# Patient Record
Sex: FEMALE | Race: WHITE | NOT HISPANIC OR LATINO | Employment: OTHER | URBAN - METROPOLITAN AREA
[De-identification: names, ages, dates, MRNs, and addresses within clinical notes are randomized per-mention and may not be internally consistent; named-entity substitution may affect disease eponyms.]

---

## 2017-01-17 ENCOUNTER — OFFICE VISIT (OUTPATIENT)
Dept: DERMATOLOGY | Facility: CLINIC | Age: 82
End: 2017-01-17
Payer: MEDICARE

## 2017-01-17 VITALS — WEIGHT: 121 LBS | BODY MASS INDEX: 27.13 KG/M2

## 2017-01-17 DIAGNOSIS — I87.2 VENOUS STASIS DERMATITIS OF BOTH LOWER EXTREMITIES: ICD-10-CM

## 2017-01-17 PROCEDURE — 99999 PR PBB SHADOW E&M-EST. PATIENT-LVL II: CPT | Mod: PBBFAC,,, | Performed by: DERMATOLOGY

## 2017-01-17 PROCEDURE — 1157F ADVNC CARE PLAN IN RCRD: CPT | Mod: S$GLB,,, | Performed by: DERMATOLOGY

## 2017-01-17 PROCEDURE — 1160F RVW MEDS BY RX/DR IN RCRD: CPT | Mod: S$GLB,,, | Performed by: DERMATOLOGY

## 2017-01-17 PROCEDURE — 1159F MED LIST DOCD IN RCRD: CPT | Mod: S$GLB,,, | Performed by: DERMATOLOGY

## 2017-01-17 PROCEDURE — 99213 OFFICE O/P EST LOW 20 MIN: CPT | Mod: S$GLB,,, | Performed by: DERMATOLOGY

## 2017-01-17 RX ORDER — BETAMETHASONE DIPROPIONATE 0.5 MG/G
CREAM TOPICAL 2 TIMES DAILY
Qty: 90 G | Refills: 3 | Status: SHIPPED | OUTPATIENT
Start: 2017-01-17 | End: 2017-01-27

## 2017-01-17 RX ORDER — NEBIVOLOL HYDROCHLORIDE 10 MG/1
TABLET ORAL
COMMUNITY
Start: 2017-01-11 | End: 2018-07-13 | Stop reason: ALTCHOICE

## 2017-01-17 RX ORDER — IRBESARTAN 75 MG/1
75 TABLET ORAL DAILY
Refills: 2 | COMMUNITY
Start: 2016-12-16 | End: 2017-07-25 | Stop reason: ALTCHOICE

## 2017-01-17 RX ORDER — CLOTRIMAZOLE AND BETAMETHASONE DIPROPIONATE 10; .64 MG/G; MG/G
CREAM TOPICAL
Refills: 2 | COMMUNITY
Start: 2016-12-23 | End: 2018-10-20

## 2017-01-17 RX ORDER — MOMETASONE FUROATE 220 UG/1
INHALANT RESPIRATORY (INHALATION)
Refills: 4 | COMMUNITY
Start: 2016-12-23 | End: 2017-03-14

## 2017-01-17 RX ORDER — AMLODIPINE BESYLATE 5 MG/1
5 TABLET ORAL NIGHTLY
Refills: 6 | COMMUNITY
Start: 2017-01-10 | End: 2018-07-13

## 2017-01-17 RX ORDER — DOXYCYCLINE 100 MG/1
CAPSULE ORAL
Refills: 0 | COMMUNITY
Start: 2016-11-28 | End: 2018-07-13

## 2017-01-17 RX ORDER — CLONIDINE HYDROCHLORIDE 0.1 MG/1
TABLET ORAL
Refills: 0 | COMMUNITY
Start: 2017-01-09 | End: 2017-03-14

## 2017-01-17 NOTE — PROGRESS NOTES
Subjective:       Patient ID:  Toya Majano is a 83 y.o. female who presents for   Chief Complaint   Patient presents with    Rash     HPI Comments: See previous note, had pyoderma of right leg and was treated for it now improved still with stasis dermatitis both lower legs.    Rash         Review of Systems   Constitutional: Negative for fever.   Skin: Positive for itching and rash.   Hematologic/Lymphatic: Does not bruise/bleed easily.        Objective:    Physical Exam   Skin:   Areas Examined (abnormalities noted in diagram):   Head / Face Inspection Performed  Neck Inspection Performed  RUE Inspected  LUE Inspection Performed  RLE Inspected  LLE Inspection Performed              Diagram Legend      Erythematous eczematous patches      Assessment / Plan:        Venous stasis dermatitis of both lower extremities  -     betamethasone dipropionate (DIPROLENE) 0.05 % cream; Apply topically 2 (two) times daily.  Dispense: 90 g; Refill: 3  Leg elevation  Support socks  hibiclens weekly in shower             Return in about 4 weeks (around 2/14/2017).

## 2017-01-17 NOTE — MR AVS SNAPSHOT
[facility]   [facility]  [place] [address]  Phone: [phone]  Fax: [phone]                  Toya Majano   2017 10:40 AM   Office Visit    Description:  Female : 3/27/1933   Provider:  Talia Mcwilliams MD   Department:  [facility]           Reason for Visit     Rash           Diagnoses this Visit        Comments    Venous stasis dermatitis of both lower extremities                To Do List           Goals (5 Years of Data)     None      Follow-Up and Disposition     Return in about 4 weeks (around 2017).       These Medications        Disp Refills Start End    betamethasone dipropionate (DIPROLENE) 0.05 % cream 90 g 3 2017    Apply topically 2 (two) times daily. - Topical (Top)    Pharmacy: [facility] #[id number] - [place] - [address]  #: [phone]         [facility] On Call     [facility] On Call Nurse Care Line -  Assistance  Registered nurses in the [facility] On Call Center provide clinical advisement, health education, appointment booking, and other advisory services.  Call for this free service at [phone].             Medications           Message regarding Medications     Verify the changes and/or additions to your medication regime listed below are the same as discussed with your clinician today.  If any of these changes or additions are incorrect, please notify your healthcare provider.             Verify that the below list of medications is an accurate representation of the medications you are currently taking.  If none reported, the list may be blank. If incorrect, please contact your healthcare provider. Carry this list with you in case of emergency.           Current Medications     amlodipine (NORVASC) 5 MG tablet Take 5 mg by mouth every evening.    ASMANEX TWISTHALER 220 mcg (30 doses) inhaler INHALE 2 PUFFS INTO LUNGS EVERY EVENING    ASMANEX TWISTHALER 220 mcg (60 doses) AePB      bisoprolol (ZEBETA) 5 MG tablet     BYSTOLIC 10 mg Tab     chlorthalidone (HYGROTEN) 25 MG Tab     ciprofloxacin HCl (CIPRO) 500 MG tablet     clobetasol (TEMOVATE) 0.05 % cream     cloNIDine (CATAPRES) 0.1 MG tablet TAKE 1 TABLET BY MOUTH TWICE A DAY OR AS DIRECTED    clotrimazole-betamethasone 1-0.05% (LOTRISONE) cream APPLY TO RIGHT LEG 3 TIMES DAILY    desoximetasone 0.05 % Gel     ELIQUIS 2.5 mg Tab     famotidine (PEPCID) 20 MG tablet Take 20 mg by mouth after dinner.    fenofibrate micronized (LOFIBRA) 134 MG Cap     folic acid (FOLVITE) 1 MG tablet     furosemide (LASIX) 20 MG tablet     furosemide (LASIX) 40 MG tablet     irbesartan (AVAPRO) 300 MG tablet     irbesartan (AVAPRO) 75 MG tablet Take 75 mg by mouth once daily.    isosorbide mononitrate (IMDUR) 120 MG 24 hr tablet     methylPREDNISolone (MEDROL) 4 MG Tab     montelukast (SINGULAIR) 10 mg tablet     MULTAQ 400 mg Tab 2 (two) times daily.     NEXIUM 40 mg capsule     PROLIA 60 mg/mL Syrg 60 mg every 6 (six) months.     RANOLAZINE (RANEXA ORAL) Take by mouth.    spironolactone (ALDACTONE) 25 MG tablet     ZETIA 10 mg tablet     betamethasone dipropionate (DIPROLENE) 0.05 % cream Apply topically 2 (two) times daily.    doxycycline (VIBRAMYCIN) 100 MG Cap TAKE ONE CAPSULE BY MOUTH EVERY 12 HOURS FOR 10 DAYS    gabapentin (NEURONTIN) 300 MG capsule Take 1 capsule (300 mg total) by mouth 3 (three) times daily.    methylPREDNISolone (MEDROL DOSEPACK) 4 mg tablet Take 4 mg by mouth. use as directed    WELCHOL 625 mg tablet            Clinical Reference Information           Vital Signs - Last Recorded  Most recent update: 1/17/2017 10:46 AM by Essie Cole MA    Wt BMI             54.9 kg (121 lb) 27.13 kg/m2         Allergies as of 1/17/2017     Adhesive    Penicillins    Codeine    Contrast Media      Immunizations Administered on Date of Encounter - 1/17/2017     None

## 2017-02-15 ENCOUNTER — OFFICE VISIT (OUTPATIENT)
Dept: DERMATOLOGY | Facility: CLINIC | Age: 82
End: 2017-02-15
Payer: MEDICARE

## 2017-02-15 VITALS — WEIGHT: 121 LBS | BODY MASS INDEX: 27.13 KG/M2

## 2017-02-15 DIAGNOSIS — B08.1 BATEMAN'S DISEASE: ICD-10-CM

## 2017-02-15 DIAGNOSIS — I87.2 STASIS DERMATITIS OF BOTH LEGS: Primary | ICD-10-CM

## 2017-02-15 PROCEDURE — 99999 PR PBB SHADOW E&M-EST. PATIENT-LVL III: CPT | Mod: PBBFAC,,, | Performed by: DERMATOLOGY

## 2017-02-15 PROCEDURE — 99213 OFFICE O/P EST LOW 20 MIN: CPT | Mod: S$GLB,,, | Performed by: DERMATOLOGY

## 2017-02-15 PROCEDURE — 1159F MED LIST DOCD IN RCRD: CPT | Mod: S$GLB,,, | Performed by: DERMATOLOGY

## 2017-02-15 PROCEDURE — 1157F ADVNC CARE PLAN IN RCRD: CPT | Mod: S$GLB,,, | Performed by: DERMATOLOGY

## 2017-02-15 PROCEDURE — 1160F RVW MEDS BY RX/DR IN RCRD: CPT | Mod: S$GLB,,, | Performed by: DERMATOLOGY

## 2017-02-15 RX ORDER — HYDRALAZINE HYDROCHLORIDE 25 MG/1
25 TABLET, FILM COATED ORAL 2 TIMES DAILY
Refills: 3 | COMMUNITY
Start: 2017-02-10

## 2017-02-15 RX ORDER — DILTIAZEM HYDROCHLORIDE 120 MG/1
CAPSULE, COATED, EXTENDED RELEASE ORAL
COMMUNITY
Start: 2017-02-13 | End: 2018-07-13

## 2017-02-15 NOTE — MR AVS SNAPSHOT
[facility]   [facility]  [place] [address]  Phone: [phone]  Fax: [phone]                  Toya Majano   2/15/2017 10:50 AM   Office Visit    Description:  Female : 3/27/1933   Provider:  Talia Mcwilliams MD   Department:  [facility]           Reason for Visit     Follow-up           Diagnoses this Visit        Comments    Stasis dermatitis of both legs    -  Primary     Shine's disease                To Do List           Goals (5 Years of Data)     None      Follow-Up and Disposition     Return in about 6 months (around 8/15/2017).      [facility] On Call     [facility] On Call Nurse Care Line -  Assistance  Registered nurses in the [facility] On Call Center provide clinical advisement, health education, appointment booking, and other advisory services.  Call for this free service at [phone].             Medications           Message regarding Medications     Verify the changes and/or additions to your medication regime listed below are the same as discussed with your clinician today.  If any of these changes or additions are incorrect, please notify your healthcare provider.             Verify that the below list of medications is an accurate representation of the medications you are currently taking.  If none reported, the list may be blank. If incorrect, please contact your healthcare provider. Carry this list with you in case of emergency.           Current Medications     amlodipine (NORVASC) 5 MG tablet Take 5 mg by mouth every evening.    ASMANEX TWISTHALER 220 mcg (30 doses) inhaler INHALE 2 PUFFS INTO LUNGS EVERY EVENING    ASMANEX TWISTHALER 220 mcg (60 doses) AePB     bisoprolol (ZEBETA) 5 MG tablet     BYSTOLIC 10 mg Tab     chlorthalidone (HYGROTEN) 25 MG Tab     ciprofloxacin HCl (CIPRO) 500 MG tablet     clobetasol (TEMOVATE) 0.05 % cream     cloNIDine (CATAPRES) 0.1 MG tablet TAKE 1 TABLET BY MOUTH TWICE A DAY OR AS DIRECTED     clotrimazole-betamethasone 1-0.05% (LOTRISONE) cream APPLY TO RIGHT LEG 3 TIMES DAILY    desoximetasone 0.05 % Gel     diltiaZEM (CARDIZEM CD) 120 MG Cp24     doxycycline (VIBRAMYCIN) 100 MG Cap TAKE ONE CAPSULE BY MOUTH EVERY 12 HOURS FOR 10 DAYS    ELIQUIS 2.5 mg Tab     famotidine (PEPCID) 20 MG tablet Take 20 mg by mouth after dinner.    fenofibrate micronized (LOFIBRA) 134 MG Cap     folic acid (FOLVITE) 1 MG tablet     furosemide (LASIX) 20 MG tablet     furosemide (LASIX) 40 MG tablet     hydrALAZINE (APRESOLINE) 25 MG tablet Take 25 mg by mouth 2 (two) times daily.    irbesartan (AVAPRO) 300 MG tablet     irbesartan (AVAPRO) 75 MG tablet Take 75 mg by mouth once daily.    isosorbide mononitrate (IMDUR) 120 MG 24 hr tablet     methylPREDNISolone (MEDROL DOSEPACK) 4 mg tablet Take 4 mg by mouth. use as directed    methylPREDNISolone (MEDROL) 4 MG Tab     montelukast (SINGULAIR) 10 mg tablet     MULTAQ 400 mg Tab 2 (two) times daily.     NEXIUM 40 mg capsule     PROLIA 60 mg/mL Syrg 60 mg every 6 (six) months.     RANOLAZINE (RANEXA ORAL) Take by mouth.    spironolactone (ALDACTONE) 25 MG tablet     WELCHOL 625 mg tablet     ZETIA 10 mg tablet     betamethasone dipropionate (DIPROLENE) 0.05 % cream Apply topically 2 (two) times daily.    gabapentin (NEURONTIN) 300 MG capsule Take 1 capsule (300 mg total) by mouth 3 (three) times daily.           Clinical Reference Information           Your Vitals Were     Weight BMI             54.9 kg (121 lb) 27.13 kg/m2         Allergies as of 2/15/2017     Adhesive    Penicillins    Codeine    Contrast Media      Immunizations Administered on Date of Encounter - 2/15/2017     None      Language Assistance Services     ATTENTION: Language assistance services are available, free of charge. Please call [phone].      ATENCIÓN: Si drela erna, tiene a treviño disposición servicios gratuitos de asistencia lingüística. Llame al [phone].     CHÚ Ý: N?u b?n nói Ti?ng  Vi?t, có các d?ch v? h? tr? ngôn ng? mi?n phí jamesh cho b?n. G?i s? [phone].         [facility] complies with applicable Federal civil rights laws and does not discriminate on the basis of race, color, national origin, age, disability, or sex.

## 2017-03-14 ENCOUNTER — INITIAL CONSULT (OUTPATIENT)
Dept: OPHTHALMOLOGY | Facility: CLINIC | Age: 82
End: 2017-03-14
Payer: MEDICARE

## 2017-03-14 DIAGNOSIS — Z96.1 PSEUDOPHAKIA OF BOTH EYES: ICD-10-CM

## 2017-03-14 DIAGNOSIS — S02.30XA ORBITAL FLOOR (BLOW-OUT) CLOSED FRACTURE: Primary | ICD-10-CM

## 2017-03-14 DIAGNOSIS — H43.813 PVD (POSTERIOR VITREOUS DETACHMENT), BOTH EYES: ICD-10-CM

## 2017-03-14 PROCEDURE — 99999 PR PBB SHADOW E&M-EST. PATIENT-LVL II: CPT | Mod: PBBFAC,,, | Performed by: OPHTHALMOLOGY

## 2017-03-14 PROCEDURE — 92004 COMPRE OPH EXAM NEW PT 1/>: CPT | Mod: S$GLB,,, | Performed by: OPHTHALMOLOGY

## 2017-03-14 PROCEDURE — 92285 EXTERNAL OCULAR PHOTOGRAPHY: CPT | Mod: S$GLB,,, | Performed by: OPHTHALMOLOGY

## 2017-03-14 RX ORDER — ONDANSETRON 4 MG/1
TABLET, ORALLY DISINTEGRATING ORAL
Refills: 0 | COMMUNITY
Start: 2017-03-08 | End: 2017-07-25 | Stop reason: SDUPTHER

## 2017-03-14 RX ORDER — MUPIROCIN 20 MG/G
OINTMENT TOPICAL
COMMUNITY
Start: 2017-02-15 | End: 2018-10-20

## 2017-03-14 RX ORDER — FLUTICASONE PROPIONATE 100 UG/1
POWDER, METERED RESPIRATORY (INHALATION)
Refills: 6 | COMMUNITY
Start: 2017-02-17

## 2017-03-14 RX ORDER — ONDANSETRON 4 MG/1
4 TABLET, FILM COATED ORAL EVERY 8 HOURS PRN
Refills: 0 | COMMUNITY
Start: 2017-03-03 | End: 2018-07-13

## 2017-03-14 RX ORDER — POLYETHYLENE GLYCOL 3350 17 G/17G
POWDER, FOR SOLUTION ORAL
Refills: 0 | COMMUNITY
Start: 2017-03-08 | End: 2018-07-13

## 2017-03-14 RX ORDER — BETAMETHASONE DIPROPIONATE 0.5 MG/G
CREAM TOPICAL
COMMUNITY
Start: 2017-03-11 | End: 2018-10-20

## 2017-03-14 RX ORDER — OXYCODONE AND ACETAMINOPHEN 5; 325 MG/1; MG/1
1 TABLET ORAL EVERY 4 HOURS PRN
Refills: 0 | COMMUNITY
Start: 2017-03-03 | End: 2018-07-13

## 2017-03-14 RX ORDER — DOXYCYCLINE 100 MG/1
100 CAPSULE ORAL 2 TIMES DAILY
Refills: 0 | COMMUNITY
Start: 2017-03-08 | End: 2018-07-13

## 2017-03-14 RX ORDER — AZELASTINE 1 MG/ML
SPRAY, METERED NASAL
Refills: 6 | COMMUNITY
Start: 2017-02-16 | End: 2018-10-20

## 2017-03-14 NOTE — PROGRESS NOTES
HPI     Patient's age: 83 y.o.    Approximate date of last eye examination:  Last week  Name of last eye doctor seen: Dr. Vasquez    Pt states that she had a fall on 2/27/17 hit face got an orbital fracture   - right eye was swollen really bad, looks better now, also hurt left knee,   was sent here by Dr. Vasquez for this.  Had double vision after the fall   gotten better.  Vision is blurry blurry, like a film over eye.         ! OTC eyedrops currently using:  None    ! Prescription eye meds currently using:  none            Last edited by Moraima Meadows MA on 3/14/2017 11:57 AM.         Assessment /Plan     For exam results, see Encounter Report.    Orbital floor (blow-out) closed fracture  -     External Photography - OU - Both Eyes    PVD (posterior vitreous detachment), both eyes    Pseudophakia of both eyes    Other orders  -     tobramycin-dexamethasone 0.3-0.1% (TOBRADEX) 0.3-0.1 % Oint; Place into the right eye 2 (two) times daily. Place over wound.  Dispense: 3.5 g; Refill: 0      Ct Maxillofacial reviewed from [facility] on 2/27/17. Minimal downward displacement of posteromedial floor fracture with retrobulbar hemorrhage. Iop great today and no apd on exam.    No diplopia.  Paretic blink and mild lower lid ectropion secondary to likely 7th nerve contusion/edema. This will improve with time.  Recommend aggressive lubrication.     No surgical intevention needed at this time.    Return in 3 months to check for enophthalmos.      Follow up with Dr. Vasquez for routine eye care.      I have reviewed and concur with the resident's history, physical, assessment, and plan.  I have personally interviewed and examined the patient.

## 2017-03-14 NOTE — LETTER
March 14, 2017      Qamar Vasquez MD  [address]  [place] [address]           [facility]  [address]  [place] [address]  Phone: [phone]  Fax: [phone]          Patient: Toya Majano   MR Number: [id number]   YOB: 1933   Date of Visit: 3/14/2017       Dear Dr. Qamar Vasquez:    Thank you for referring Toya Majano to me for evaluation. Attached you will find relevant portions of my assessment and plan of care.    If you have questions, please do not hesitate to call me. I look forward to following Toya Majano along with you.    Sincerely,    Clary Lim MD    Enclosure  CC:  No Recipients    If you would like to receive this communication electronically, please contact [email or web address] or [phone] to request more information on [employer] access.    For providers and/or their staff who would like to refer a patient to [facility], please contact us through our one-stop-shop provider referral line, [facility], at [phone].    If you feel you have received this communication in error or would no longer like to receive these types of communications, please e-mail [email or web address]

## 2017-05-26 ENCOUNTER — TELEPHONE (OUTPATIENT)
Dept: OPHTHALMOLOGY | Facility: CLINIC | Age: 82
End: 2017-05-26

## 2017-06-02 ENCOUNTER — TELEPHONE (OUTPATIENT)
Dept: OPHTHALMOLOGY | Facility: CLINIC | Age: 82
End: 2017-06-02

## 2017-06-02 NOTE — TELEPHONE ENCOUNTER
----- Message from [employer] sent at 6/2/2017 12:44 PM CDT -----  Contact: Toya Majano [[id number]]  Pt states she received call from Dr Lim's office this morning about appt and wants to speak with nurse,please call back at [phone],thanks

## 2017-06-13 ENCOUNTER — OFFICE VISIT (OUTPATIENT)
Dept: OTOLARYNGOLOGY | Facility: CLINIC | Age: 82
End: 2017-06-13
Payer: MEDICARE

## 2017-06-13 VITALS
DIASTOLIC BLOOD PRESSURE: 55 MMHG | HEART RATE: 64 BPM | BODY MASS INDEX: 23.53 KG/M2 | TEMPERATURE: 97 F | WEIGHT: 104.94 LBS | SYSTOLIC BLOOD PRESSURE: 136 MMHG

## 2017-06-13 DIAGNOSIS — J38.7 PRESBYLARYNX: ICD-10-CM

## 2017-06-13 DIAGNOSIS — J30.0 VASOMOTOR RHINITIS: Primary | ICD-10-CM

## 2017-06-13 PROCEDURE — 99213 OFFICE O/P EST LOW 20 MIN: CPT | Mod: S$GLB,,, | Performed by: OTOLARYNGOLOGY

## 2017-06-13 PROCEDURE — 99999 PR PBB SHADOW E&M-EST. PATIENT-LVL II: CPT | Mod: PBBFAC,,, | Performed by: OTOLARYNGOLOGY

## 2017-06-13 PROCEDURE — 1159F MED LIST DOCD IN RCRD: CPT | Mod: S$GLB,,, | Performed by: OTOLARYNGOLOGY

## 2017-06-13 PROCEDURE — 1126F AMNT PAIN NOTED NONE PRSNT: CPT | Mod: S$GLB,,, | Performed by: OTOLARYNGOLOGY

## 2017-06-13 NOTE — PROGRESS NOTES
Chief Complaint   Patient presents with    Hoarse       HPI   84 y.o. female presents with a recent history of clear rhinorrhea.  She states that this problem started several mos ago.  She was started on TID Atrovent but developed a headache after two days of use.  She is unsure if this medication provided any relief.  She reports chronic itching of the eyes but denies sneezing, facial pain or discolored rhinorrhea.  She is also concerned that she has recently grown more hoarse.  This symptom is of particular concern given her history of TVC paralysis.     Review of Systems   Constitutional: Negative for fatigue and unexpected weight change.   HENT: Per HPI.  Eyes: Negative for visual disturbance.   Respiratory: Negative for shortness of breath, hemoptysis   Cardiovascular: Negative for chest pain and palpitations.   Musculoskeletal: Negative for decreased ROM, back pain.   Skin: Negative for rash, sunburn, itching.   Neurological: Negative for dizziness and seizures.   Hematological: Negative for adenopathy. Does not bruise/bleed easily.   Endocrine: Negative for rapid weight loss/weight gain, heat/cold intolerance.     Past Medical History   Patient Active Problem List   Diagnosis    Atrial fibrillation    HTN (hypertension)    Hyperlipidemia    Asthma    MVP (mitral valve prolapse)    PVD (peripheral vascular disease)    Myelodysplasia    GERD (gastroesophageal reflux disease)    Vocal cord paralysis    Vocal cord paralysis           Past Surgical History   Past Surgical History:   Procedure Laterality Date    BALLOON ANGIOPLASTY, ARTERY  1995    CATARACT EXTRACTION      Cataract surgery Bilateral     HERNIA REPAIR      Abdominal hernia repair    JOINT REPLACEMENT Right     Microsuspension laryngoscopy  03/18/2015    Microsuspension laryngoscopy with use of operating telescope with      Rotator cuff surgery  2000    Surgery for blocked artery in groin (fem-pop; bypass groin to knee per patient)  Left          Family History   Family History   Problem Relation Age of Onset    Congenital heart disease Mother     Cancer Mother      Breast    Congenital heart disease Sister            Social History   .  Social History     Social History    Marital status:      Spouse name: N/A    Number of children: N/A    Years of education: N/A     Occupational History    Not on file.     Social History Main Topics    Smoking status: Former Smoker     Quit date: 1/1/1979    Smokeless tobacco: Never Used    Alcohol use 0.0 oz/week    Drug use: No    Sexual activity: Yes     Partners: Male     Other Topics Concern    Not on file     Social History Narrative    No narrative on file         Allergies   Review of patient's allergies indicates:   Allergen Reactions    Adhesive      bleeding    Penicillins     Codeine Rash    Contrast media Rash           Physical Exam     Vitals:    06/13/17 1445   BP: (!) 136/55   Pulse: 64   Temp: 96.8 °F (36 °C)         Body mass index is 23.53 kg/m².      General: AOx3, NAD   Respiratory:  Symmetric chest rise, normal effort  Right Ear: External Auditory Canal WNL,TM w/o masses/lesions/perforations.  Middle ear without evidence of effusion.   Left Ear: External Auditory Canal WNL,TM w/o masses/lesions/perforations.  Middle ear without evidence of effusion.   Nose: No gross nasal septal deviation. Inferior Turbinates WNL bilaterally. No septal perforation. No masses/lesions.   Oral Cavity:  Oral Tongue mobile, no lesions noted. Hard Palate WNL. No buccal or FOM lesions.  Oropharynx:  No masses/lesions of the posterior pharyngeal wall. Tonsillar fossa without lesions. Soft palate without masses. Midline uvula.   Neck: No scars.  No cervical lymphadenopathy, thyromegaly or thyroid nodules.  Normal range of motion.    Face: House Brackmann I bilaterally.     NP: No lesions of posterior wall  OP: No lesions of posterior wall or BOT. BOT is soft to palpation  Larynx: No  lesions of glottic or supraglottic larynx. Normal vocal fold mobility    HP: No lesions of pyriform sinuses or postcricoid region  Mirror examination was performed.      Assessment/Plan  Problem List Items Addressed This Visit        Pulmonary    Presbylarynx     Hoarseness likely secondary to presbylarynx.  TVC mobile today.  Will refer to Dr. Jamil if she continues to be bothered.          Vasomotor rhinitis - Primary     Resume Atrovent daily for a week and increase to BID for 1 week then TID for 1 week if daily dosing is ineffective. She is to call if she develops side effects such as headache.            Other Visit Diagnoses    None.

## 2017-06-13 NOTE — ASSESSMENT & PLAN NOTE
Resume Atrovent daily for a week and increase to BID for 1 week then TID for 1 week if daily dosing is ineffective. She is to call if she develops side effects such as headache.

## 2017-06-13 NOTE — ASSESSMENT & PLAN NOTE
Hoarseness likely secondary to presbylarynx.  TVC mobile today.  Will refer to Dr. Jamil if she continues to be bothered.

## 2017-07-25 ENCOUNTER — OFFICE VISIT (OUTPATIENT)
Dept: OPHTHALMOLOGY | Facility: CLINIC | Age: 82
End: 2017-07-25
Payer: MEDICARE

## 2017-07-25 DIAGNOSIS — H02.102 ECTROPION OF RIGHT LOWER EYELID, UNSPECIFIED ECTROPION TYPE: Primary | ICD-10-CM

## 2017-07-25 DIAGNOSIS — H02.203 LAGOPHTHALMOS OF RIGHT EYE: ICD-10-CM

## 2017-07-25 DIAGNOSIS — S02.30XA ORBITAL FLOOR (BLOW-OUT) CLOSED FRACTURE: ICD-10-CM

## 2017-07-25 PROCEDURE — 99999 PR PBB SHADOW E&M-EST. PATIENT-LVL II: CPT | Mod: PBBFAC,,, | Performed by: OPHTHALMOLOGY

## 2017-07-25 PROCEDURE — 92285 EXTERNAL OCULAR PHOTOGRAPHY: CPT | Mod: S$GLB,,, | Performed by: OPHTHALMOLOGY

## 2017-07-25 PROCEDURE — 92014 COMPRE OPH EXAM EST PT 1/>: CPT | Mod: S$GLB,,, | Performed by: OPHTHALMOLOGY

## 2017-07-25 NOTE — PROGRESS NOTES
HPI     Name of last eye doctor seen: Dr. Vasquez     Patient complaints of tenderness OD and tearing. Pt  noticed   drooping RLL.     Last edited by Juan Whittington MA on 7/25/2017  2:27 PM. (History)            Assessment /Plan     For exam results, see Encounter Report.    Ectropion of right lower eyelid, unspecified ectropion type    Orbital floor (blow-out) closed fracture  -     External Photography - OU - Both Eyes    Lagophthalmos of right eye      Plan for right lower eyelid ectropion repair.  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     Informed consent obtained after extensive risks/benefits/alternatives were discussed with the patient including but not limited to pain, bleeding, infection, ocular injury, loss of the eye, asymmetry, need for revision in future, scarring.  Alternatives such as waiting were discussed.  All questions were answered.      Hold ASA, NSAIDS, and fish oil 5 to 7 days prior to procedure. Hold Eliquis three days prior to surgery.    Return for surgery    Follow-up with Dr. Vasquez for routine eye care.

## 2017-08-07 ENCOUNTER — TELEPHONE (OUTPATIENT)
Dept: OPHTHALMOLOGY | Facility: CLINIC | Age: 82
End: 2017-08-07

## 2017-08-07 DIAGNOSIS — H02.102 ECTROPION OF RIGHT LOWER EYELID, UNSPECIFIED ECTROPION TYPE: Primary | ICD-10-CM

## 2017-08-08 ENCOUNTER — TELEPHONE (OUTPATIENT)
Dept: OPHTHALMOLOGY | Facility: CLINIC | Age: 82
End: 2017-08-08

## 2017-08-08 NOTE — TELEPHONE ENCOUNTER
----- Message from Bertha Prince sent at 8/8/2017  1:26 PM CDT -----  Contact: Toya Majano   Pt returned the called back ,can we call the pt back please ,pt can reached at [phone] or [phone] please thanks please thank.

## 2017-09-08 ENCOUNTER — OFFICE VISIT (OUTPATIENT)
Dept: URGENT CARE | Facility: CLINIC | Age: 82
End: 2017-09-08
Payer: MEDICARE

## 2017-09-08 VITALS
TEMPERATURE: 98 F | RESPIRATION RATE: 18 BRPM | HEIGHT: 56 IN | BODY MASS INDEX: 22.5 KG/M2 | WEIGHT: 100 LBS | SYSTOLIC BLOOD PRESSURE: 156 MMHG | HEART RATE: 66 BPM | OXYGEN SATURATION: 94 % | DIASTOLIC BLOOD PRESSURE: 62 MMHG

## 2017-09-08 DIAGNOSIS — J30.9 ACUTE ALLERGIC RHINITIS, UNSPECIFIED SEASONALITY, UNSPECIFIED TRIGGER: Primary | ICD-10-CM

## 2017-09-08 DIAGNOSIS — R05.9 COUGH: ICD-10-CM

## 2017-09-08 PROCEDURE — 3008F BODY MASS INDEX DOCD: CPT | Mod: S$GLB,,, | Performed by: EMERGENCY MEDICINE

## 2017-09-08 PROCEDURE — 3078F DIAST BP <80 MM HG: CPT | Mod: S$GLB,,, | Performed by: EMERGENCY MEDICINE

## 2017-09-08 PROCEDURE — 1159F MED LIST DOCD IN RCRD: CPT | Mod: S$GLB,,, | Performed by: EMERGENCY MEDICINE

## 2017-09-08 PROCEDURE — 99214 OFFICE O/P EST MOD 30 MIN: CPT | Mod: 25,S$GLB,, | Performed by: EMERGENCY MEDICINE

## 2017-09-08 PROCEDURE — 3077F SYST BP >= 140 MM HG: CPT | Mod: S$GLB,,, | Performed by: EMERGENCY MEDICINE

## 2017-09-08 PROCEDURE — 96372 THER/PROPH/DIAG INJ SC/IM: CPT | Mod: S$GLB,,, | Performed by: EMERGENCY MEDICINE

## 2017-09-08 RX ORDER — BENZONATATE 100 MG/1
200 CAPSULE ORAL 3 TIMES DAILY PRN
Qty: 20 CAPSULE | Refills: 0 | Status: SHIPPED | OUTPATIENT
Start: 2017-09-08 | End: 2018-07-13 | Stop reason: ALTCHOICE

## 2017-09-08 RX ORDER — DEXAMETHASONE SODIUM PHOSPHATE 100 MG/10ML
4 INJECTION INTRAMUSCULAR; INTRAVENOUS
Status: COMPLETED | OUTPATIENT
Start: 2017-09-08 | End: 2017-09-08

## 2017-09-08 RX ORDER — ALPRAZOLAM 0.5 MG/1
0.5 TABLET ORAL 3 TIMES DAILY
COMMUNITY

## 2017-09-08 RX ADMIN — DEXAMETHASONE SODIUM PHOSPHATE 4 MG: 100 INJECTION INTRAMUSCULAR; INTRAVENOUS at 04:09

## 2017-09-08 NOTE — PROGRESS NOTES
"Subjective:       Patient ID: Toya Majano is a 84 y.o. female.    Vitals:  height is 4' 8" (1.422 m) and weight is 45.4 kg (100 lb). Her temperature is 97.8 °F (36.6 °C). Her blood pressure is 156/62 (abnormal) and her pulse is 66. Her respiration is 18 and oxygen saturation is 94% (abnormal).     Chief Complaint: Sinus Problem    Pt. States her nml clear sinus drainage turned yellow yesterday when a weather front came thru, was coughing last night more than usual, doing better today, no fever/sore throat/ear ache, has not tried perles for cough, OK with steroid Im, NOC.      Sinus Problem   This is a new problem. The current episode started today. The problem is unchanged. There has been no fever. Her pain is at a severity of 2/10. The pain is mild. Associated symptoms include congestion and coughing. Pertinent negatives include no chills, ear pain, headaches, hoarse voice, shortness of breath or sore throat.     Review of Systems   Constitution: Negative for chills, fever and malaise/fatigue.   HENT: Positive for congestion. Negative for ear pain, hoarse voice and sore throat.    Eyes: Negative for discharge and redness.   Cardiovascular: Negative for chest pain, dyspnea on exertion and leg swelling.   Respiratory: Positive for cough and sputum production. Negative for shortness of breath and wheezing.    Musculoskeletal: Negative for myalgias.   Gastrointestinal: Negative for abdominal pain and nausea.   Neurological: Negative for headaches.       Objective:      Physical Exam   Constitutional: She is oriented to person, place, and time. She appears well-developed and well-nourished.   HENT:   Head: Normocephalic and atraumatic.   Right Ear: External ear normal.   Left Ear: External ear normal.   Nose: Mucosal edema and rhinorrhea present. Right sinus exhibits maxillary sinus tenderness. Right sinus exhibits no frontal sinus tenderness. Left sinus exhibits no maxillary sinus tenderness and no frontal " sinus tenderness.   Mouth/Throat: Uvula is midline, oropharynx is clear and moist and mucous membranes are normal.   Neck: Normal range of motion. Neck supple.   Cardiovascular: Normal rate, regular rhythm and normal heart sounds.    Pulmonary/Chest: Breath sounds normal.   Neurological: She is alert and oriented to person, place, and time.   Skin: Skin is warm and dry.   Psychiatric: She has a normal mood and affect. Her behavior is normal.       Assessment:       1. Acute allergic rhinitis, unspecified seasonality, unspecified trigger    2. Cough        Plan:         Acute allergic rhinitis, unspecified seasonality, unspecified trigger  -     dexamethasone injection 4 mg; Inject 0.4 mLs (4 mg total) into the muscle one time.    Cough  -     benzonatate (TESSALON PERLES) 100 MG capsule; Take 2 capsules (200 mg total) by mouth 3 (three) times daily as needed for Cough.  Dispense: 20 capsule; Refill: 0      Jerald Stubbs MD  Go to the Emergency Department for any problems

## 2017-09-08 NOTE — PATIENT INSTRUCTIONS
Jerald Stubbs MD  Go to the Emergency Department for any problems    Allergic Rhinitis  Allergic rhinitis is an allergic reaction that affects the nose, and often the eyes. Its often known as nasal allergies. Nasal allergies are often due to things in the environment that are breathed in. Depending what you are sensitive to, nasal allergies may occur only during certain seasons. Or they may occur year round. Common indoor allergens include house dust mites, mold, cockroaches, and pet dander. Outdoor allergens include pollen from trees, grasses, and weeds.   Symptoms include a drippy, stuffy, and itchy nose. They also include sneezing and red and itchy eyes. You may feel tired more often. Severe allergies may also affect your breathing and trigger a condition called asthma.   Tests can be done to see what allergens are affecting you. You may be referred to an allergy specialist for testing and further evaluation.  Home care  Your healthcare provider may prescribe medicines to help relieve allergy symptoms. These may include oral medicines, nasal sprays, or eye drops.  Ask your provider for advice on how to avoid substances that you are allergic to. Below are a few tips for each type of allergen.  Pet dander:  · Do not have pets with fur and feathers.  · If you can't avoid having a pet, keep it out of your bedroom and off upholstered furniture.  Pollen:  · When pollen counts are high, keep windows of your car and home closed. If possible, use an air conditioner instead.  · Wear a filter mask when mowing or doing yard work.  House dust mites:  · Wash bedding every week in warm water and detergent and dry on a hot setting.  · Cover the mattress, box spring, and pillows with allergy covers.   · If possible, sleep in a room with no carpet, curtains, or upholstered furniture.  Cockroaches:  · Store food in sealed containers.  · Remove garbage from the home promptly.  · Fix water leaks  Mold:  · Keep humidity low by using  a dehumidifier or air conditioner. Keep the dehumidifier and air conditioner clean and free of mold.  · Clean moldy areas with bleach and water.  In general:  · Vacuum once or twice a week. If possible, use a vacuum with a high-efficiency particulate air (HEPA) filter.  · Do not smoke. Avoid cigarette smoke. Cigarette smoke is an irritant that can make symptoms worse.  Follow-up care  Follow up as advised by the healthcare provider or our staff. If you were referred to an allergy specialist, make this appointment promptly.  When to seek medical advice  Call your healthcare provider right away if the following occur:  · Coughing or wheezing  · Fever greater than 100.4°F (38°C)  · Hives (raised red bumps)  · Continuing symptoms, new symptoms, or worsening symptoms  Call 911 right away if you have:  · Trouble breathing  · Severe swelling of the face or severe itching of the eyes or mouth  Date Last Reviewed: 3/1/2017  © 7546-4395 [employer]. [address] [place] [address]. All rights reserved. This information is not intended as a substitute for professional medical care. Always follow your healthcare professional's instructions.        Sinusitis (No Antibiotics)    The sinuses are air-filled spaces within the bones of the face. They connect to the inside of the nose. Sinusitis is an inflammation of the tissue lining the sinus cavity. Sinus inflammation can occur during a cold. It can also be due to allergies to pollens and other particles in the air. It can cause symptoms such as sinus congestion, headache, sore throat, facial swelling and fullness. It may also cause a low-grade fever. No infection is present, and no antibiotic treatment is needed.  Home care  · Drink plenty of water, hot tea, and other liquids. This may help thin mucus. It also may promote sinus drainage.  · Heat may help soothe painful areas of the face. Use a towel soaked in hot water. Or,  the shower and direct  the hot spray onto your face. Using a vaporizer along with a menthol rub at night may also help.   · An expectorant containing guaifenesin may help thin the mucus and promote drainage from the sinuses.  · Over-the-counter decongestants may be used unless a similar medicine was prescribed. Nasal sprays work the fastest. Use one that contains phenylephrine or oxymetazoline. First blow the nose gently. Then use the spray. Do not use these medicines more often than directed on the label or symptoms may get worse. You may also use tablets containing pseudoephedrine. Avoid products that combine ingredients, because side effects may be increased. Read labels. You can also ask the pharmacist for help. (NOTE: Persons with high blood pressure should not use decongestants. They can raise blood pressure.)  · Over-the-counter antihistamines may help if allergies contributed to your sinusitis.    · Use acetaminophen or ibuprofen to control pain, unless another pain medicine was prescribed. (If you have chronic liver or kidney disease or ever had a stomach ulcer, talk with your doctor before using these medicines. Aspirin should never be used in anyone under 18 years of age who is ill with a fever. It may cause severe liver damage.)  · Use nasal rinses or irrigation as instructed by your health care provider.  · Don't smoke. This can worsen symptoms.  Follow-up care  Follow up with your healthcare provider or our staff if you are not improving within the next week.  When to seek medical advice  Call your healthcare provider if any of these occur:  · Green or yellow discharge from the nose or into the throat  · Facial pain or headache becoming more severe  · Stiff neck  · Unusual drowsiness or confusion  · Swelling of the forehead or eyelids  · Vision problems, including blurred or double vision  · Fever of 100.4ºF (38ºC) or higher, or as directed by your healthcare provider  · Seizure  · Breathing problems  · Symptoms not  resolving within 10 days  Date Last Reviewed: 4/13/2015  © 4706-5706 [employer]. [address] [place] [address], [place] [address]. All rights reserved. This information is not intended as a substitute for professional medical care. Always follow your healthcare professional's instructions.

## 2017-10-17 ENCOUNTER — OFFICE VISIT (OUTPATIENT)
Dept: OPHTHALMOLOGY | Facility: CLINIC | Age: 82
End: 2017-10-17
Payer: MEDICARE

## 2017-10-17 DIAGNOSIS — S02.30XA ORBITAL FLOOR (BLOW-OUT) CLOSED FRACTURE: ICD-10-CM

## 2017-10-17 DIAGNOSIS — H02.202: ICD-10-CM

## 2017-10-17 DIAGNOSIS — H02.102 ECTROPION OF RIGHT LOWER EYELID, UNSPECIFIED ECTROPION TYPE: Primary | ICD-10-CM

## 2017-10-17 PROCEDURE — 99999 PR PBB SHADOW E&M-EST. PATIENT-LVL II: CPT | Mod: PBBFAC,,, | Performed by: OPHTHALMOLOGY

## 2017-10-17 PROCEDURE — 92285 EXTERNAL OCULAR PHOTOGRAPHY: CPT | Mod: S$GLB,,, | Performed by: OPHTHALMOLOGY

## 2017-10-17 PROCEDURE — 92012 INTRM OPH EXAM EST PATIENT: CPT | Mod: S$GLB,,, | Performed by: OPHTHALMOLOGY

## 2017-10-17 RX ORDER — IPRATROPIUM BROMIDE 21 UG/1
SPRAY, METERED NASAL
Refills: 3 | COMMUNITY
Start: 2017-09-30 | End: 2018-10-20

## 2017-10-17 RX ORDER — PANTOPRAZOLE SODIUM 40 MG/1
TABLET, DELAYED RELEASE ORAL
COMMUNITY
Start: 2017-08-14 | End: 2018-10-20

## 2017-10-17 RX ORDER — AMMONIUM LACTATE 12 G/100G
CREAM TOPICAL
COMMUNITY
Start: 2017-08-24 | End: 2018-10-20

## 2017-10-17 NOTE — PROGRESS NOTES
HPI     Mrs. Pittman is here today for a recheck for ectropion bilateral. She   states Dr. Vasquez told her that it did not look like she needed surgery   anymore so she is here today for a second opinion. She is scheduled for   surgery 12/22    Last edited by Raina Abdi, PCT on 10/17/2017  2:35 PM. (History)            Assessment /Plan     For exam results, see Encounter Report.    Ectropion of right lower eyelid, unspecified ectropion type    Orbital floor (blow-out) closed fracture  -     External Photography - OU - Both Eyes    Lagophthalmos of right lower eyelid, unspecified type      No need for surgical intervention at this time. Rll ectropion and lagophthalmos has resolved. 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    Return to Dr. Vasquez for routine eye care.

## 2017-10-26 ENCOUNTER — TELEPHONE (OUTPATIENT)
Dept: OPHTHALMOLOGY | Facility: CLINIC | Age: 82
End: 2017-10-26

## 2017-10-26 NOTE — TELEPHONE ENCOUNTER
----- Message from Bertha Prince sent at 10/26/2017  4:28 PM CDT -----  Contact: Toya Majano   Pt would like to speak with  nurse please about the eye surgery ,pt can be reached at [phone] please thank you.

## 2018-03-22 ENCOUNTER — OFFICE VISIT (OUTPATIENT)
Dept: OTOLARYNGOLOGY | Facility: CLINIC | Age: 83
End: 2018-03-22
Payer: MEDICARE

## 2018-03-22 VITALS — BODY MASS INDEX: 22.83 KG/M2 | WEIGHT: 101.88 LBS | TEMPERATURE: 97 F

## 2018-03-22 DIAGNOSIS — J38.7 PRESBYLARYNX: ICD-10-CM

## 2018-03-22 DIAGNOSIS — J30.0 CHRONIC VASOMOTOR RHINITIS: Primary | ICD-10-CM

## 2018-03-22 PROCEDURE — 99999 PR PBB SHADOW E&M-EST. PATIENT-LVL II: CPT | Mod: PBBFAC,,, | Performed by: OTOLARYNGOLOGY

## 2018-03-22 PROCEDURE — 31575 DIAGNOSTIC LARYNGOSCOPY: CPT | Mod: S$GLB,,, | Performed by: OTOLARYNGOLOGY

## 2018-03-22 PROCEDURE — 99213 OFFICE O/P EST LOW 20 MIN: CPT | Mod: 25,S$GLB,, | Performed by: OTOLARYNGOLOGY

## 2018-03-22 RX ORDER — IPRATROPIUM BROMIDE 21 UG/1
2 SPRAY, METERED NASAL 3 TIMES DAILY
Qty: 30 ML | Refills: 3 | Status: SHIPPED | OUTPATIENT
Start: 2018-03-22 | End: 2018-07-13

## 2018-03-22 NOTE — ASSESSMENT & PLAN NOTE
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  I explained that her dysphonia is due to presbylarynx but is compounded by GERD and her use of steroid inhalers.  I offered her an appt with Dr. Jamil but she was comfortable with reassurance.

## 2018-03-22 NOTE — PROGRESS NOTES
Chief Complaint   Patient presents with    follow up/ hoarseness       HPI   84 y.o. female presents with a ~1 month history of hoarseness.  She is quite concerned about this problem given her history of TVC paralysis.  She reports that this problem to began after having difficulty swallowing a po-boy.  She reports that her throat hurts from time to time with swallowing.  No SOB.  No pain with speaking. She regularly uses a steroid inhaler for lung disease.  She also reports that she frequently feels phlegm in her throat after eating.  She has a known hiatal hernia. She reports that she frequently experiences clear, watery drainage from her nose after eating.  She has used Atrovent nasal in the past but is not sure if she derived any benefit from this medication.     Review of Systems   Constitutional: Negative for fatigue and unexpected weight change.   HENT: Per HPI.  Eyes: Negative for visual disturbance.   Respiratory: Negative for shortness of breath, hemoptysis   Cardiovascular: Negative for chest pain and palpitations.   Musculoskeletal: Negative for decreased ROM, back pain.   Skin: Negative for rash, sunburn, itching.   Neurological: Negative for dizziness and seizures.   Hematological: Negative for adenopathy. Does not bruise/bleed easily.   Endocrine: Negative for rapid weight loss/weight gain, heat/cold intolerance.     Past Medical History   Patient Active Problem List   Diagnosis    Atrial fibrillation    HTN (hypertension)    Hyperlipidemia    Asthma    MVP (mitral valve prolapse)    PVD (peripheral vascular disease)    Myelodysplasia    GERD (gastroesophageal reflux disease)    Vocal cord paralysis    Vocal cord paralysis    Vasomotor rhinitis    Presbylarynx    Acute allergic rhinitis    Cough           Past Surgical History   Past Surgical History:   Procedure Laterality Date    BALLOON ANGIOPLASTY, ARTERY  1995    CATARACT EXTRACTION      Cataract surgery Bilateral     HERNIA  REPAIR      Abdominal hernia repair    JOINT REPLACEMENT Right     Microsuspension laryngoscopy  03/18/2015    Microsuspension laryngoscopy with use of operating telescope with      Rotator cuff surgery  2000    Surgery for blocked artery in groin (fem-pop; bypass groin to knee per patient) Left          Family History   Family History   Problem Relation Age of Onset    Congenital heart disease Mother     Cancer Mother      Breast    No Known Problems Father     Congenital heart disease Sister     No Known Problems Brother     No Known Problems Maternal Aunt     No Known Problems Maternal Uncle     No Known Problems Paternal Aunt     No Known Problems Paternal Uncle     No Known Problems Maternal Grandmother     No Known Problems Maternal Grandfather     No Known Problems Paternal Grandmother     No Known Problems Paternal Grandfather     Amblyopia Neg Hx     Blindness Neg Hx     Cataracts Neg Hx     Diabetes Neg Hx     Glaucoma Neg Hx     Hypertension Neg Hx     Macular degeneration Neg Hx     Retinal detachment Neg Hx     Strabismus Neg Hx     Stroke Neg Hx     Thyroid disease Neg Hx            Social History   .  Social History     Social History    Marital status:      Spouse name: N/A    Number of children: N/A    Years of education: N/A     Occupational History    Not on file.     Social History Main Topics    Smoking status: Former Smoker     Quit date: 1/1/1979    Smokeless tobacco: Never Used    Alcohol use 0.0 oz/week    Drug use: No    Sexual activity: Yes     Partners: Male     Other Topics Concern    Not on file     Social History Narrative    No narrative on file         Allergies   Review of patient's allergies indicates:   Allergen Reactions    Adhesive      bleeding    Penicillins     Codeine Rash    Contrast media Rash           Physical Exam     Vitals:    03/22/18 1435   Temp: 97.3 °F (36.3 °C)         Body mass index is 22.83 kg/m².      General:  AOx3, NAD   Respiratory:  Symmetric chest rise, normal effort  Right Ear: External Auditory Canal WNL,TM w/o masses/lesions/perforations.  Middle ear without evidence of effusion.   Left Ear: External Auditory Canal WNL,TM w/o masses/lesions/perforations.  Middle ear without evidence of effusion.   Nose: No gross nasal septal deviation. Inferior Turbinates WNL bilaterally. No septal perforation. No masses/lesions.   Oral Cavity:  Oral Tongue mobile, no lesions noted. Hard Palate WNL. No buccal or FOM lesions.  Oropharynx:  No masses/lesions of the posterior pharyngeal wall. Tonsillar fossa without lesions. Soft palate without masses. Midline uvula.   Neck: No scars.  No cervical lymphadenopathy, thyromegaly or thyroid nodules.  Normal range of motion.    Face: House Brackmann I bilaterally.     Flex Naso Sonya Hypo Procedures #2    Procedure:  Diagnostic flexible nasopharyngoscopy, laryngoscopy and hypopharyngoscopy:    Routine preparation with local atomizer with 1% neosynephrine/pontocaine with customary flexible endoscope.    Nasopharynx:  No lesions.   Mucosa:  No lesions.   Adenoids:  Present.  Posterior Choanae:  Patent.  Eustachian Tubes:  Patent.  Posterior pharynx:  No lesions.  Larynx/hypopharynx:   Epiglottis:  No lesions, without edema.   AE Folds:  No lesions.   Vocal cords:  No polyps, nodules, ulcers or lesions.   Mobility:  Equal and normal bilateral.   Hypopharynx:  No lesions.   Piriform sinus:  No pooling, no lesions.   Post Cricoid:  No erythema, no edema.      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  I explained that her dysphonia is due to presbylarynx but is compounded by GERD and her use of steroid inhalers.  I offered her an appt with Dr. Jamil but she was comfortable with reassurance.          Vasomotor  rhinitis - Primary     Restart Atrovent nasal.

## 2018-04-16 RX ORDER — ONDANSETRON 4 MG/1
TABLET, FILM COATED ORAL
Qty: 10 TABLET | Refills: 0 | OUTPATIENT
Start: 2018-04-16

## 2018-04-30 ENCOUNTER — OFFICE VISIT (OUTPATIENT)
Dept: OTOLARYNGOLOGY | Facility: CLINIC | Age: 83
End: 2018-04-30
Payer: MEDICARE

## 2018-04-30 VITALS
BODY MASS INDEX: 22.98 KG/M2 | HEART RATE: 88 BPM | WEIGHT: 102.5 LBS | SYSTOLIC BLOOD PRESSURE: 167 MMHG | DIASTOLIC BLOOD PRESSURE: 74 MMHG

## 2018-04-30 DIAGNOSIS — J38.7 PRESBYLARYNX: Primary | ICD-10-CM

## 2018-04-30 PROCEDURE — 99213 OFFICE O/P EST LOW 20 MIN: CPT | Mod: 25,S$GLB,, | Performed by: OTOLARYNGOLOGY

## 2018-04-30 PROCEDURE — 3077F SYST BP >= 140 MM HG: CPT | Mod: CPTII,S$GLB,, | Performed by: OTOLARYNGOLOGY

## 2018-04-30 PROCEDURE — 3078F DIAST BP <80 MM HG: CPT | Mod: CPTII,S$GLB,, | Performed by: OTOLARYNGOLOGY

## 2018-04-30 PROCEDURE — 99999 PR PBB SHADOW E&M-EST. PATIENT-LVL II: CPT | Mod: PBBFAC,,, | Performed by: OTOLARYNGOLOGY

## 2018-04-30 PROCEDURE — 31575 DIAGNOSTIC LARYNGOSCOPY: CPT | Mod: S$GLB,,, | Performed by: OTOLARYNGOLOGY

## 2018-04-30 NOTE — PROGRESS NOTES
Chief Complaint   Patient presents with    Hoarse       HPI   85 y.o. female presents with a ~2 month history of hoarseness.  She is quite concerned about this problem given her history of TVC paralysis.  She was last seen ~1 month ago.  She reports that her voice has worsened since that time.  She must exert significant effort to project her voice.  She denies ralph throat pain. This issue has become quite problematic. She finds it very difficult to communicate with her  who is hard of hearing.     Review of Systems   Constitutional: Negative for fatigue and unexpected weight change.   HENT: Per HPI.  Eyes: Negative for visual disturbance.   Respiratory: Negative for shortness of breath, hemoptysis   Cardiovascular: Negative for chest pain and palpitations.   Musculoskeletal: Negative for decreased ROM, back pain.   Skin: Negative for rash, sunburn, itching.   Neurological: Negative for dizziness and seizures.   Hematological: Negative for adenopathy. Does not bruise/bleed easily.   Endocrine: Negative for rapid weight loss/weight gain, heat/cold intolerance.     Past Medical History   Patient Active Problem List   Diagnosis    Atrial fibrillation    HTN (hypertension)    Hyperlipidemia    Asthma    MVP (mitral valve prolapse)    PVD (peripheral vascular disease)    Myelodysplasia    GERD (gastroesophageal reflux disease)    Vocal cord paralysis    Vocal cord paralysis    Vasomotor rhinitis    Presbylarynx    Acute allergic rhinitis    Cough           Past Surgical History   Past Surgical History:   Procedure Laterality Date    BALLOON ANGIOPLASTY, ARTERY  1995    CATARACT EXTRACTION      Cataract surgery Bilateral     HERNIA REPAIR      Abdominal hernia repair    JOINT REPLACEMENT Right     Microsuspension laryngoscopy  03/18/2015    Microsuspension laryngoscopy with use of operating telescope with      Rotator cuff surgery  2000    Surgery for blocked artery in groin (fem-pop; bypass  groin to knee per patient) Left          Family History   Family History   Problem Relation Age of Onset    Congenital heart disease Mother     Cancer Mother      Breast    No Known Problems Father     Congenital heart disease Sister     No Known Problems Brother     No Known Problems Maternal Aunt     No Known Problems Maternal Uncle     No Known Problems Paternal Aunt     No Known Problems Paternal Uncle     No Known Problems Maternal Grandmother     No Known Problems Maternal Grandfather     No Known Problems Paternal Grandmother     No Known Problems Paternal Grandfather     Amblyopia Neg Hx     Blindness Neg Hx     Cataracts Neg Hx     Diabetes Neg Hx     Glaucoma Neg Hx     Hypertension Neg Hx     Macular degeneration Neg Hx     Retinal detachment Neg Hx     Strabismus Neg Hx     Stroke Neg Hx     Thyroid disease Neg Hx            Social History   .  Social History     Social History    Marital status:      Spouse name: N/A    Number of children: N/A    Years of education: N/A     Occupational History    Not on file.     Social History Main Topics    Smoking status: Former Smoker     Quit date: 1/1/1979    Smokeless tobacco: Never Used    Alcohol use 0.0 oz/week    Drug use: No    Sexual activity: Yes     Partners: Male     Other Topics Concern    Not on file     Social History Narrative    No narrative on file         Allergies   Review of patient's allergies indicates:   Allergen Reactions    Adhesive      bleeding    Penicillins     Codeine Rash    Contrast media Rash           Physical Exam     Vitals:    04/30/18 1544   BP: (!) 167/74   Pulse: 88         Body mass index is 22.98 kg/m².      General: AOx3, NAD   Respiratory:  Symmetric chest rise, normal effort  Right Ear: External Auditory Canal WNL,TM w/o masses/lesions/perforations.  Middle ear without evidence of effusion.   Left Ear: External Auditory Canal WNL,TM w/o masses/lesions/perforations.  Middle  ear without evidence of effusion.   Nose: No gross nasal septal deviation. Inferior Turbinates WNL bilaterally. No septal perforation. No masses/lesions.   Oral Cavity:  Oral Tongue mobile, no lesions noted. Hard Palate WNL. No buccal or FOM lesions.  Oropharynx:  No masses/lesions of the posterior pharyngeal wall. Tonsillar fossa without lesions. Soft palate without masses. Midline uvula.   Neck: No scars.  No cervical lymphadenopathy, thyromegaly or thyroid nodules.  Normal range of motion.    Face: House Brackmann I bilaterally.     Flex Naso Sonya Hypo Procedures #2    Procedure:  Diagnostic flexible nasopharyngoscopy, laryngoscopy and hypopharyngoscopy:    Routine preparation with local atomizer with 1% neosynephrine/pontocaine with customary flexible endoscope.    Nasopharynx:  No lesions.   Mucosa:  No lesions.   Adenoids:  Present.  Posterior Choanae:  Patent.  Eustachian Tubes:  Patent.  Posterior pharynx:  No lesions.  Larynx/hypopharynx:   Epiglottis:  No lesions, without edema.   AE Folds:  No lesions.   Vocal cords:  No polyps, nodules, ulcers or lesions.   Mobility:  R TVC with excellent abduction, limited adduction.  L TVC WNL.    Hypopharynx:  No lesions.   Piriform sinus:  No pooling, no lesions.   Post Cricoid:  No erythema, no edema.      Assessment/Plan  Problem List Items Addressed This Visit        ENT    Presbylarynx - Primary     Hoarseness likely primarily due to presbylarynx with concomitant residual R TVC weakness.   I  will arrange for her to see Dr. Jamil for evaluation for injection laryngoplasty.

## 2018-04-30 NOTE — ASSESSMENT & PLAN NOTE
Hoarseness likely primarily due to presbylarynx with concomitant residual R TVC weakness.   I will arrange for her to see Dr. Jamil for evaluation for injection laryngoplasty.

## 2018-05-02 ENCOUNTER — OFFICE VISIT (OUTPATIENT)
Dept: OTOLARYNGOLOGY | Facility: CLINIC | Age: 83
End: 2018-05-02
Payer: MEDICARE

## 2018-05-02 VITALS
SYSTOLIC BLOOD PRESSURE: 152 MMHG | HEART RATE: 96 BPM | DIASTOLIC BLOOD PRESSURE: 68 MMHG | WEIGHT: 102.31 LBS | BODY MASS INDEX: 22.93 KG/M2 | TEMPERATURE: 98 F

## 2018-05-02 DIAGNOSIS — J38.00 VOCAL CORD PARALYSIS: Primary | ICD-10-CM

## 2018-05-02 DIAGNOSIS — R49.0 DYSPHONIA: ICD-10-CM

## 2018-05-02 PROCEDURE — 99214 OFFICE O/P EST MOD 30 MIN: CPT | Mod: 25,S$GLB,, | Performed by: OTOLARYNGOLOGY

## 2018-05-02 PROCEDURE — 3077F SYST BP >= 140 MM HG: CPT | Mod: CPTII,S$GLB,, | Performed by: OTOLARYNGOLOGY

## 2018-05-02 PROCEDURE — 99999 PR PBB SHADOW E&M-EST. PATIENT-LVL III: CPT | Mod: PBBFAC,,, | Performed by: OTOLARYNGOLOGY

## 2018-05-02 PROCEDURE — 3078F DIAST BP <80 MM HG: CPT | Mod: CPTII,S$GLB,, | Performed by: OTOLARYNGOLOGY

## 2018-05-02 PROCEDURE — 31579 LARYNGOSCOPY TELESCOPIC: CPT | Mod: S$GLB,,, | Performed by: OTOLARYNGOLOGY

## 2018-05-02 NOTE — Clinical Note
May 7, 2018      Michael Buck MD  [address]  [facility] [address]           [facility]  [address] [facility] [address]  [facility] [address]  Phone: [phone]  Fax: [phone]          Patient: Toya Majano   MR Number: [id number]   YOB: 1933   Date of Visit: 5/2/2018       Dear Dr. Michael Buck:    Thank you for referring Toya Majano to me for evaluation. Attached you will find relevant portions of my assessment and plan of care.    If you have questions, please do not hesitate to call me. I look forward to following Toya Majano along with you.    Sincerely,    Kareem Howard  CC:  No Recipients    If you would like to receive this communication electronically, please contact [email or web address] or [phone] to request more information on [employer] access.    For providers and/or their staff who would like to refer a patient to [facility], please contact us through our one-stop-shop provider referral line, [facility], at [phone].    If you feel you have received this communication in error or would no longer like to receive these types of communications, please e-mail [email or web address]

## 2018-05-02 NOTE — PROGRESS NOTES
[facility]  Department of Otorhinolaryngology and Communication Sciences    Toya Majano is a 85 y.o. female who presents to the [facility] for consultation at the kind request of Dr. Buck for further management of hoarseness.     She complains of hoarseness, difficulty being understood, difficulty projecting her voice. Onset of her present complaints was fairly sudden. Duration is approximately 1-2 months, beginning while eating a po-boy. She had the sensation of a piece of bread getting caught in the throat which caused her to cough. Time course is constant. Symptoms are worsening. Exacerbating factors include voice use. She denies any alleviating factors. She denies any associated symptoms.  She does complain of post nasal drip and throat clearing.  Sometimes liquids make her cough/clear her throat.      She has a history of right vocal fold paralysis in 2015, for which she underwent injection augmentation with Radiesse voice gel (Dr. Buck) in 3/18/15. She did very well following the procedure, and only began to experience deterioration of her voice recently.     Voice Handicap Index-10 (VHI-10) score is 34.   Reflux Symptom Index (RSI) score is 23.   Eating Assessment Tool-10 (EAT-10) score is 0.     Past Medical History  She has a past medical history of Allergy; Anemia; Arthritis; Asthma; Atrial fibrillation; Chronic cough; Degenerative disc disease; GERD (gastroesophageal reflux disease); Hyperlipidemia; Hypertension; Kyphosis; Myelodysplasia; Peripheral vascular disease; Thoracic degenerative disc disease; and Unilateral vocal cord paralysis.    Past Surgical History  She has a past surgical history that includes Surgery for blocked artery in groin (fem-pop; bypass groin to knee per patient) (Left); Cataract surgery (Bilateral); Balloon angioplasty, artery (1995); Joint replacement (Right); Rotator cuff surgery (2000); Hernia repair; Microsuspension laryngoscopy (03/18/2015); and  Cataract extraction.    Family History  Her family history includes Cancer in her mother; Congenital heart disease in her mother and sister; No Known Problems in her brother, father, maternal aunt, maternal grandfather, maternal grandmother, maternal uncle, paternal aunt, paternal grandfather, paternal grandmother, and paternal uncle.    Social History  She reports that she quit smoking about 39 years ago. She has never used smokeless tobacco. She reports that she drinks alcohol. She reports that she does not use drugs.    Allergies  She is allergic to adhesive; penicillins; codeine; and contrast media.    Medications  She has a current medication list which includes the following prescription(s): alprazolam, amlodipine, ammonium lactate, azelastine, benzonatate, betamethasone dipropionate, bisoprolol, bystolic, chlorthalidone, ciprofloxacin hcl, clobetasol, clotrimazole-betamethasone 1-0.05%, desoximetasone, diltiazem, doxycycline, doxycycline, eliquis, epoetin chidi, famotidine, fenofibrate micronized, flovent diskus, folic acid, furosemide, furosemide, hydralazine, ipratropium, ipratropium, isosorbide mononitrate, methylprednisolone, methylprednisolone, montelukast, multaq, mupirocin, nexium, ondansetron, oxycodone-acetaminophen, pantoprazole, polyethylene glycol, prolia, ranolazine, spironolactone, welchol, zetia, and gabapentin.    Review of Systems   Constitutional: Negative for fever.   HENT: Negative for sore throat.    Eyes: Negative for visual disturbance.   Respiratory: Negative for wheezing.    Cardiovascular: Negative for chest pain.   Gastrointestinal: Positive for nausea.   Musculoskeletal: Positive for arthralgias.   Skin: Negative for rash.   Neurological: Negative for tremors.   Hematological: Bruises/bleeds easily.   Psychiatric/Behavioral: The patient is nervous/anxious.           Objective:     VS reviewed     Physical Exam    Constitutional: comfortable, well dressed  Psychiatric: appropriate  affect  Respiratory: comfortably breathing, symmetric chest rise, no stridor  Voice: severe breathiness; high pitched; severe strain; impaired flexibility  Cardiovascular: upper extremities non-edematous  Lymphatic: no cervical lymphadenopathy  Neurologic: alert and oriented to time, place, person, and situation; cranial nerves 3-12 grossly intact  Head: normocephalic  Eyes: conjunctivae and sclerae clear  Ears: normal pinnae, normal external auditory canals, tympanic membranes intact  Nose: mucosa pink and noncongested, no masses, no mucopurulence, no polyps  Oral cavity / oropharynx: no mucosal lesions  Neck: soft, full range of motion, laryngotracheal complex palpable with appropriate landmarks, larynx elevates on swallowing  Indirect laryngoscopy: limited due to gag    Procedure  Rigid Laryngeal Videostroboscopy (26143): Laryngeal videostroboscopy is indicated to assess the laryngeal vibratory biomechanics and vocal fold oscillation, which cannot be assessed with a plain light examination. This was carried out with a 70 degree endoscope. After verbal consent was obtained, the patient was positioned and the tongue was gently secured with a gauze sponge. The endoscope was passed transorally and positioned to image the larynx and hypopharynx in detail. The following features were examined: laryngeal and hypopharyngeal masses; vocal fold range and symmetry of motion; laryngeal mucosal edema, erythema, inflammation, and hydration; salivary pooling; and gross laryngeal sensation. During phonation, the vocal folds were assessed for glottal closure; mucosal wave; vocal fold lesions; vibratory periodicity, amplitude, and phase symmetry; and vertical height match. The equipment was removed. The patient tolerated the procedure well without complication. All findings were normal except:  - right vocal fold with dense hypomobility, paramedian  - glottal insufficiency across all frequenceis  - primarily aperiodic vibratoin  -  moderate posterior gap  - mild vertical height mismatch  - compenstaory supraglottic hyperfunction      Data Reviewed    CT head/neck 2015: negative for etiology; CXR 2015: negative for etiology      Assessment:     Toya Majano is a 85 y.o. female with chronic, idiopathic, right vocal fold paralysis.       Plan:        I had a discussion with the patient and her family regarding her condition and the further workup and management options, the mainstay of which is procedural intervention. We discussed both thyroplasty surgery and vocal fold injection augmentation. The patient would prefer to avoid surgery if possible. The risks and benefits of vocal fold injection augmentation were discussed with the patient. Risks included but were not limited to bleeding, infection, allergic reaction to the injectable, scarring, worsening of voice, early resorption, need for additional procedures, pain, epistaxis, and airway edema which could necessitate need for intubation or tracheostomy. We informed the patient that while in the past this procedure was performed mainly in the operating room under general anesthesia, we now primarily perform this procedure in our procedure suite without the need for general anesthesia.    All questions were answered and the patient would like to proceed with an office-based right vocal fold injection augmentation procedure. We will plan to use calcium hydroxyapatite. Informed consent was obtained. We will arrange this in the coming weeks.    All questions were answered, and the patient is in agreement with the above.     Geraldo Jamil M.D.  [facility]  Department of Otorhinolaryngology and Communication Sciences

## 2018-05-02 NOTE — PATIENT INSTRUCTIONS
VOCAL FOLD INJECTION AUGMENTATION     Description   If the vocal folds (vocal cords) cannot close completely, you may experience voice problems: roughness, breathiness, inability to get loud, increased vocal effort, increased vocal fatigue. Some patients may also experience aspiration (coughing or choking with swallowing). Vocal fold injection augmentation plumps up the vocal fold and/or repositions it in the midline in order to help the vocal folds close completely while speaking or swallowing. Following the procedure, most patients experience a louder, stronger, clearer voice. The procedure also helps some patients protect against aspiration, although the swallowing improvement is not as dramatic as the voice improvement. We use the following materials for the procedure: hyaluronic acid (Restylane); carboxymethylcellulose (Radiesse Voice Gel); and calcium hydroxyapatite (Radiesse Voice). For most patients, the injection is performed with a small needle passed through the skin of the neck. However, in some patients we perform the injection with a device passed through the mouth. In either case, the injection is guided by the visualization provided by a scope passed through the nose.     What to expect during the procedure   We perform the injection in our office under local (topical) anesthesia. You are awake the whole time, and the entire procedure lasts about 15 minutes. Our primary focus is your safety and comfort. We usually make the larynx numb by spraying the throat and/or dripping anesthetic on the larynx. At this time, you may cough or gag, or you may have the sensation that you spilled some water down the wrong pipe. These are temporary sensations that allow us to get you numb. The numbing process takes about 2 minutes. We will not proceed until we know you will be as comfortable as possible. A small needle is passed either through the skin of the neck or via a long instrument through the mouth to  perform the injection. During the injection, you may experience mild discomfort in the throat. You may feel an unusual sensation in the ear because the larynx and the ear share the same sensory nerve. In rare cases in which a patient does not tolerate the procedure, it may be performed in the operating room, with the patient completely asleep under general anesthesia.     What to expect afterwards   Most patients note a stronger, less effortful voice immediately after the injection. Sometimes the voice is tight or pressed voice is noted right after the injection. The voice usually has good days and bad days and gradually improves until you reach your new baseline voice over the first 1-2 weeks. Voice therapy may be a necessary part of your treatment plan to optimize your vocal outcome. None of the materials we inject are permanent. As the material dissolves, you may experience a gradual worsening of voice quality over the course of several months. At this point we may consider repeating the injection. You may be a candidate for a permanent fix, which involves an open surgery in the neck performed in the operating room.     Instructions: before the procedure   1. Do not take aspirin-containing products or other medications that can thin the blood (such as ibuprofen, Advil, Motrin, Aleve, Plavix) for 7 days prior to the injection. If you take Coumadin (warfarin), you may need to stop using this a few days prior to the injection. If you are on blood thinning (anti-platelet or anti-coagulant) medication and it is not clear what you should do, please clarify this with your physician.   2. On the day of your procedure, please make sure you take your other regular morning medications.   3. On the day of your procedure, it is OK to eat and drink as you would normally, up until 3 hours before to your appointment time. During that time frame, we ask that you restrict yourself only to clear liquids. A clear liquid is anything  you can see through (water, ginger ale, apple juice).     Instructions: after the procedure   1. Please refrain from eating or drinking for 1 hour following the procedure. This allows time for the numbing medication to wear off.   2. Most patients experience very little pain. If necessary, most patients are able to keep comfortable with plain Tylenol (acetaminophen) and/or other non-steroidal anti-inflammatory medications such as ibuprofen (Advil, Motrin). Please follow package instructions if considering taking these medications.   3. In most instances, it is OK to use your voice immediately after the procedure. However, for the first week, you should avoid speaking over heavy background noise or in a very loud voice. It is rare, but in some cases you will be asked to rest your voice for the first 24 hours.   4. Please call the [facility] Center at [phone] if   · You have a temperature above 101°F   · You develop Increasing throat pain not relieved by over-the-counter medications   · You have any other post-operative questions or concerns   5. Please go immediately to the nearest emergency room if you are experiencing   · Shortness of breath   · Difficulty breathing   · Difficulty swallowing   · Severe bleeding     FREQUENTLY ASKED QUESTIONS     Is this a Botox injection? No. Botox weakens the voice box muscles. Instead, with a vocal fold injection augmentation, we are injecting filler material to bulk up the vocal fold(s).     How do you decide which material to use for the injection? Our decision is based on the indication for the procedure, the position of the vocal fold, and other patient historical/anatomical factors. In some instances, the approval of your insurance company is an important factor.     How to you decide which technique to use (through the neck versus through the mouth)? Patient anatomy and the position of the vocal fold play an important role. Other patient factors such as gag reflex are  also strongly considered.     Why do you perform this in your office instead of in the operating room? Performing the procedure in the office is safe and precise. In addition, performing the injection with the patient awake gives us direct visual and auditory feedback, which we do not get when the patient is asleep in the operating room. Furthermore, an office-based injection is much less time consuming, is more convenient for the patient, and does not involve the risks or the recovery time associated with general anesthesia. We can still do this in the operating room; we save that setting for specific diagnoses or situations, as well as for the rare patient who is unable to tolerate the awake procedure.     Why did I have discomfort in my ear (during or after the injection)? This is an example of referred pain. The ear and the larynx share the same sensory nerve.     I got an injection 3 days ago. Why is my voice still hoarse? To optimize vocal outcome, we overinject a little bit. Additionally, there may be a mild amount of swelling. Finally, the muscles of the larynx need to adjust to the injected material. Most people will have good days and bad days at first. After 1-2 weeks, you should settle out to your new baseline voice.     How long does the injection last? Carboxymethylcellulose (Radiesse Voice Gel) lasts approximately 1-2 months. Hyaluronic acid (Restylane) lasts approximately 4 months. Calcium hydroxyapatite (Radiesse Voice) lasts up to 1 year; however its characteristics are such that only few patients are appropriate for using this material.     Is there a permanent injectable material? No.     Can the injection be repeated? Yes. There is no limit to the number of times an injection can be repeated. However, a permanent surgical fix is often an option to consider.

## 2018-05-23 ENCOUNTER — TELEPHONE (OUTPATIENT)
Dept: OTOLARYNGOLOGY | Facility: CLINIC | Age: 83
End: 2018-05-23

## 2018-05-23 NOTE — TELEPHONE ENCOUNTER
Instructed Ms. Ramírezallen to hold Eliquis starting this evening until after vocal cord injection Friday, 5/25 per Dr. Flor, cardiology. Verbalized understanding.

## 2018-05-25 ENCOUNTER — PROCEDURE VISIT (OUTPATIENT)
Dept: OTOLARYNGOLOGY | Facility: CLINIC | Age: 83
End: 2018-05-25
Payer: MEDICARE

## 2018-05-25 VITALS
DIASTOLIC BLOOD PRESSURE: 67 MMHG | WEIGHT: 99.88 LBS | HEART RATE: 51 BPM | TEMPERATURE: 96 F | SYSTOLIC BLOOD PRESSURE: 167 MMHG | BODY MASS INDEX: 22.39 KG/M2

## 2018-05-25 DIAGNOSIS — J38.00 VOCAL CORD PARALYSIS: Primary | ICD-10-CM

## 2018-05-25 PROCEDURE — 31574 LARGSC W/NJX AUGMENTATION: CPT | Mod: RT,S$GLB,, | Performed by: OTOLARYNGOLOGY

## 2018-05-25 PROCEDURE — L8607 INJ VOCAL CORD BULKING AGENT: HCPCS | Mod: S$GLB,,, | Performed by: OTOLARYNGOLOGY

## 2018-05-25 RX ORDER — CLINDAMYCIN HYDROCHLORIDE 150 MG/1
150 CAPSULE ORAL 2 TIMES DAILY
COMMUNITY
End: 2018-07-13

## 2018-05-25 NOTE — PROCEDURES
Procedures   [facility]  Department of Otorhinolaryngology and Communication Sciences    Awake Laryngeal Procedure Operative Report    Preoperative Diagnosis: 1. Right vocal fold immobility.  2. Glottal insufficiency.  3. Dysphonia.    Postoperative Diagnosis: 1. Right vocal fold immobility.  2. Glottal insufficiency.  3. Dysphonia.    Procedure: Transnasal flexible magnified laryngoscopy with right vocal fold injection augmentation with calcium hydroxyapatite ([facility]).    Surgeon: Geraldo Jamil M.D.     Estimated blood loss: None.    Anesthesia: Local and topical.    Complications: None.    Findings: Appropriate medialization, convexity, and support with a total volume of 0.5 mL calcium hydroxyapatite ([facility]).    Indications for procedure: Toya Majano is a 85 y.o. female with  right vocal fold immobility,  chronic in nature, idiopathic in nature. The patient presents for elective vocal fold injection augmentation. Risks of the procedure were discussed, including but not limited to bleeding, infection, allergic reaction to the injectable or medication, scarring, worsening of voice, early resorption, need for additional procedures, pain, epistaxis, laryngospasm, and airway obstruction which could necessitate need for intubation or tracheostomy. All questions were answered and the patient agreed to move forward with the procedure. Informed consent was obtained.    Procedure in detail: Toya Majano was positively identified with two identifiers and was brought into the procedure suite. She was seated upright in a comfortable position. Final time-out was performed for verification purposes. Local cutaneous and subcutaneous anesthesia was achieved with 1 mL of 2% lidocaine  injected into the skin and subcutaneous tissues at the level of the thyroid notch and into the pre-epiglottic space. Oropharyngeal anesthesia was not necessary. The nasal cavity was topically decongested with  1% phenylephrine and topically anesthetized with 4% lidocaine. The distal chip digital videolaryngoscope was advanced through the patients most patent nasal cavity. The larynx was inspected under maximal magnification, with findings as noted above.    Attention was turned toward anesthetizing the endolarynx, which was achieved with a total volume of 3 mL of 4% lidocaine administered  in consecutive aliquots through the side port of the laryngoscope using the laryngeal gargle technique.    After an adequate degree of anesthesia was obtained, attention was turned to injection augmentation. A syringe pre-loaded with calcium hydroxyapatite ([facility] Voice) was loaded onto a 23 gauge 1.5 inch needle. Under the guidance of magnified laryngoscopy, the needle was advanced percutaneously, through the thyrohyoid membrane and infrapetiole epiglottis, into the endolarynx. The needle was advanced into the right vocal fold at the junction of the vocal process with the superior arcuate line. After confirmation of appropriate depth of the needle tip, careful injection augmentation of the left focal fold was carried out. Appropriate fullness, convexity, support, and medialization were achieved, with slight overcorrection, with a total volume of 0.5 mL injected. A pressed but less effortful voice, as well as a stronger cough, were noted immediately. The equipment was removed. The patient tolerated the procedure well without complication. All needle and instrument counts were correct at the completion of the case.    Attestation: As the attending of record, I was present and participated in all portions of this procedure.    Disposition: The patient was observed briefly before being discharged home in good condition. She was instructed to call the office or return to the emergency department should she develop a fever greater than 101 degrees F, increasing pain not relieved by over-the-counter medication, shortness of breath or noisy  breathing, difficulty swallowing, swelling, bleeding, or any other concerns. Post-procedure instructions were provided and were reviewed with the patient and her family, who acknowledged understanding. She will follow up with me in about 4 weeks.    Geraldo Jamil M.D.  [facility]  Department of Otorhinolaryngology and Communication Sciences

## 2018-05-25 NOTE — PATIENT INSTRUCTIONS
VOCAL FOLD INJECTION AUGMENTATION     Description   If the vocal folds (vocal cords) cannot close completely, you may experience voice problems: roughness, breathiness, inability to get loud, increased vocal effort, increased vocal fatigue. Some patients may also experience aspiration (coughing or choking with swallowing). Vocal fold injection augmentation plumps up the vocal fold and/or repositions it in the midline in order to help the vocal folds close completely while speaking or swallowing. Following the procedure, most patients experience a louder, stronger, clearer voice. The procedure also helps some patients protect against aspiration, although the swallowing improvement is not as dramatic as the voice improvement. We use the following materials for the procedure: hyaluronic acid (Restylane); carboxymethylcellulose (Radiesse Voice Gel); and calcium hydroxyapatite (Radiesse Voice). For most patients, the injection is performed with a small needle passed through the skin of the neck. However, in some patients we perform the injection with a device passed through the mouth. In either case, the injection is guided by the visualization provided by a scope passed through the nose.     What to expect during the procedure   We perform the injection in our office under local (topical) anesthesia. You are awake the whole time, and the entire procedure lasts about 15 minutes. Our primary focus is your safety and comfort. We usually make the larynx numb by spraying the throat and/or dripping anesthetic on the larynx. At this time, you may cough or gag, or you may have the sensation that you spilled some water down the wrong pipe. These are temporary sensations that allow us to get you numb. The numbing process takes about 2 minutes. We will not proceed until we know you will be as comfortable as possible. A small needle is passed either through the skin of the neck or via a long instrument through the mouth to  perform the injection. During the injection, you may experience mild discomfort in the throat. You may feel an unusual sensation in the ear because the larynx and the ear share the same sensory nerve. In rare cases in which a patient does not tolerate the procedure, it may be performed in the operating room, with the patient completely asleep under general anesthesia.     What to expect afterwards   Most patients note a stronger, less effortful voice immediately after the injection. Sometimes the voice is tight or pressed voice is noted right after the injection. The voice usually has good days and bad days and gradually improves until you reach your new baseline voice over the first 1-2 weeks. Voice therapy may be a necessary part of your treatment plan to optimize your vocal outcome. None of the materials we inject are permanent. As the material dissolves, you may experience a gradual worsening of voice quality over the course of several months. At this point we may consider repeating the injection. You may be a candidate for a permanent fix, which involves an open surgery in the neck performed in the operating room.     Instructions: before the procedure   1. Do not take aspirin-containing products or other medications that can thin the blood (such as ibuprofen, Advil, Motrin, Aleve, Plavix) for 7 days prior to the injection. If you take Coumadin (warfarin), you may need to stop using this a few days prior to the injection. If you are on blood thinning (anti-platelet or anti-coagulant) medication and it is not clear what you should do, please clarify this with your physician.   2. On the day of your procedure, please make sure you take your other regular morning medications.   3. On the day of your procedure, it is OK to eat and drink as you would normally, up until 3 hours before to your appointment time. During that time frame, we ask that you restrict yourself only to clear liquids. A clear liquid is anything  you can see through (water, ginger ale, apple juice).     Instructions: after the procedure   1. Please refrain from eating or drinking for 1 hour following the procedure. This allows time for the numbing medication to wear off.   2. Most patients experience very little pain. If necessary, most patients are able to keep comfortable with plain Tylenol (acetaminophen) and/or other non-steroidal anti-inflammatory medications such as ibuprofen (Advil, Motrin). Please follow package instructions if considering taking these medications.   3. In most instances, it is OK to use your voice immediately after the procedure. However, for the first week, you should avoid speaking over heavy background noise or in a very loud voice. It is rare, but in some cases you will be asked to rest your voice for the first 24 hours.   4. Please call the [facility] Center at [phone] if   · You have a temperature above 101°F   · You develop Increasing throat pain not relieved by over-the-counter medications   · You have any other post-operative questions or concerns   5. Please go immediately to the nearest emergency room if you are experiencing   · Shortness of breath   · Difficulty breathing   · Difficulty swallowing   · Severe bleeding     FREQUENTLY ASKED QUESTIONS     Is this a Botox injection? No. Botox weakens the voice box muscles. Instead, with a vocal fold injection augmentation, we are injecting filler material to bulk up the vocal fold(s).     How do you decide which material to use for the injection? Our decision is based on the indication for the procedure, the position of the vocal fold, and other patient historical/anatomical factors. In some instances, the approval of your insurance company is an important factor.     How to you decide which technique to use (through the neck versus through the mouth)? Patient anatomy and the position of the vocal fold play an important role. Other patient factors such as gag reflex are  also strongly considered.     Why do you perform this in your office instead of in the operating room? Performing the procedure in the office is safe and precise. In addition, performing the injection with the patient awake gives us direct visual and auditory feedback, which we do not get when the patient is asleep in the operating room. Furthermore, an office-based injection is much less time consuming, is more convenient for the patient, and does not involve the risks or the recovery time associated with general anesthesia. We can still do this in the operating room; we save that setting for specific diagnoses or situations, as well as for the rare patient who is unable to tolerate the awake procedure.     Why did I have discomfort in my ear (during or after the injection)? This is an example of referred pain. The ear and the larynx share the same sensory nerve.     I got an injection 3 days ago. Why is my voice still hoarse? To optimize vocal outcome, we overinject a little bit. Additionally, there may be a mild amount of swelling. Finally, the muscles of the larynx need to adjust to the injected material. Most people will have good days and bad days at first. After 1-2 weeks, you should settle out to your new baseline voice.     How long does the injection last? Carboxymethylcellulose (Radiesse Voice Gel) lasts approximately 1-2 months. Hyaluronic acid (Restylane) lasts approximately 4 months. Calcium hydroxyapatite (Radiesse Voice) lasts up to 1 year; however its characteristics are such that only few patients are appropriate for using this material.     Is there a permanent injectable material? No.     Can the injection be repeated? Yes. There is no limit to the number of times an injection can be repeated. However, a permanent surgical fix is often an option to consider.

## 2018-07-09 ENCOUNTER — TELEPHONE (OUTPATIENT)
Dept: OTOLARYNGOLOGY | Facility: CLINIC | Age: 83
End: 2018-07-09

## 2018-07-09 NOTE — TELEPHONE ENCOUNTER
----- Message from Doug Brannon sent at 7/6/2018  4:30 PM CDT -----  Contact: Pt @ [phone]  Needs Advice    Reason for call:  Patient has been trying to schedule an appt soon from a surgery a few weeks ago    Communication Preference: Call @ # provided  Additional Information: Please call ASAP - patient has called several times today

## 2018-07-09 NOTE — TELEPHONE ENCOUNTER
----- Message from Doug Brannon sent at 7/9/2018 10:29 AM CDT -----  Contact: Pt called @ [phone] or [phone]  Needs Advice    Reason for call:  Can not be seen on 07/11 (has a procedure @ [facility])   - needs to be done a different day. Maybe 07/13?  Communication Preference: Call @ #s Provided  Additional Information:

## 2018-07-13 ENCOUNTER — OFFICE VISIT (OUTPATIENT)
Dept: OTOLARYNGOLOGY | Facility: CLINIC | Age: 83
End: 2018-07-13
Payer: MEDICARE

## 2018-07-13 VITALS
BODY MASS INDEX: 21.47 KG/M2 | SYSTOLIC BLOOD PRESSURE: 140 MMHG | DIASTOLIC BLOOD PRESSURE: 69 MMHG | WEIGHT: 95.44 LBS | HEIGHT: 56 IN | HEART RATE: 78 BPM | TEMPERATURE: 97 F

## 2018-07-13 DIAGNOSIS — J38.00 VOCAL CORD PARALYSIS: Primary | ICD-10-CM

## 2018-07-13 DIAGNOSIS — R49.0 DYSPHONIA: ICD-10-CM

## 2018-07-13 PROCEDURE — 99999 PR PBB SHADOW E&M-EST. PATIENT-LVL III: CPT | Mod: PBBFAC,,, | Performed by: OTOLARYNGOLOGY

## 2018-07-13 PROCEDURE — 3077F SYST BP >= 140 MM HG: CPT | Mod: CPTII,S$GLB,, | Performed by: OTOLARYNGOLOGY

## 2018-07-13 PROCEDURE — 31579 LARYNGOSCOPY TELESCOPIC: CPT | Mod: S$GLB,,, | Performed by: OTOLARYNGOLOGY

## 2018-07-13 PROCEDURE — 99213 OFFICE O/P EST LOW 20 MIN: CPT | Mod: 25,S$GLB,, | Performed by: OTOLARYNGOLOGY

## 2018-07-13 PROCEDURE — 3078F DIAST BP <80 MM HG: CPT | Mod: CPTII,S$GLB,, | Performed by: OTOLARYNGOLOGY

## 2018-07-13 RX ORDER — MONTELUKAST SODIUM 10 MG/1
TABLET ORAL
COMMUNITY

## 2018-07-13 RX ORDER — GABAPENTIN 100 MG/1
100 CAPSULE ORAL 3 TIMES DAILY
Refills: 1 | COMMUNITY
Start: 2018-05-09

## 2018-07-13 RX ORDER — ALBUTEROL SULFATE 0.83 MG/ML
SOLUTION RESPIRATORY (INHALATION)
COMMUNITY
End: 2018-10-20

## 2018-07-13 RX ORDER — TROSPIUM CHLORIDE 20 MG/1
20 TABLET, FILM COATED ORAL 2 TIMES DAILY
Refills: 3 | COMMUNITY
Start: 2018-05-21

## 2018-07-13 RX ORDER — DILTIAZEM HYDROCHLORIDE 30 MG/1
30 TABLET, FILM COATED ORAL 2 TIMES DAILY
Refills: 3 | COMMUNITY
Start: 2018-06-29 | End: 2018-10-20

## 2018-07-13 RX ORDER — ISOSORBIDE MONONITRATE 120 MG/1
TABLET, EXTENDED RELEASE ORAL
COMMUNITY
End: 2018-10-20

## 2018-07-13 NOTE — PATIENT INSTRUCTIONS
WHAT TO EXPECT FROM VOICE THERAPY    Purpose  The purpose of voice therapy is to help you find a better, more efficient way to use your voice or to reduce symptoms such as coughing, throat clearing, or difficulty breathing.  Depending on your symptoms, you may learn how to produce clearer voice quality, how to reduce fatigue or pain associated with speaking, how to take care of your voice, and how to eliminate chronic coughing or throat clearing.      Process: Evaluation  First, you may go through some initial testing.  In most cases, a videostroboscopy will be performed in order to allow your speech pathologist and your physician to look at your vocal cords to aid in deciding if you would benefit from voice therapy.  Next, you may work with the speech pathologist to assess the current capabilities of your voice.  Following evaluation, your speech pathologist will design a therapeutic plan to improve your voice as well as other symptoms that may bother you.  At the time of evaluation, your speech pathologist may provide you with exercises to try at home.      Process: Therapy  Most patients benefit from 2-8 sessions over 1-3 months.  Voice therapy involves changing the behavior of your vocal cords and speaking habits, so it is very important that you attend your appointments and do home practices as instructed by your speech pathologist.  Home practice and participation in therapy are critical to meeting your desired voice goals, so of course, we are able to work with you to schedule appointments that are convenient for you.

## 2018-07-13 NOTE — PROGRESS NOTES
"[facility]  Department of Otorhinolaryngology and Communication Sciences    Subjective:      Toya Majano is a 85 y.o. female who presents for follow-up. She has  chronic, idiopathic, right vocal fold paralysis.    TREATMENT HISTORY:  5/25/2018: right TVF injection aug - [facility]    Pt does not appreciate any improvement since procedure. Still can't be heard. Feels no improvement. However, has noted no deterioration since the injection. Difficult to communicate with her , who is hard of hearing. Interested in another injection, if indicated. Had an MBSS yesterday at . Was told it was normal. Results not available.    The patient's medications, allergies, past medical, surgical, social and family histories were reviewed and updated as appropriate.    A detailed review of systems was obtained with pertinent positives as per the above HPI, and otherwise negative.      Objective:     BP (!) 140/69   Pulse 78   Temp 97.2 °F (36.2 °C) (Tympanic)   Ht 4' 8" (1.422 m)   Wt 43.3 kg (95 lb 7.4 oz)   LMP  (LMP Unknown)   BMI 21.40 kg/m²      Constitutional: comfortable, well dressed  Psychiatric: appropriate affect  Respiratory: comfortably breathing, symmetric chest rise, no stridor  Voice: interval improvements; mild-moderate, variable roughness/strain/breathiness; low breath support; marked improvements in MPT and stability  Head: normocephalic  Eyes: conjunctivae and sclerae clear  Indirect laryngoscopy is limited due to gag    Procedure  Rigid Laryngeal Videostroboscopy (42284): Laryngeal videostroboscopy is indicated to assess the laryngeal vibratory biomechanics and vocal fold oscillation, which cannot be assessed with a plain light examination. This was carried out with a 70 degree endoscope. After verbal consent was obtained, the patient was positioned and the tongue was gently secured with a gauze sponge. The endoscope was passed transorally and positioned to image the larynx and " hypopharynx in detail. The following features were examined: laryngeal and hypopharyngeal masses; vocal fold range and symmetry of motion; laryngeal mucosal edema, erythema, inflammation, and hydration; salivary pooling; and gross laryngeal sensation. During phonation, the vocal folds were assessed for glottal closure; mucosal wave; vocal fold lesions; vibratory periodicity, amplitude, and phase symmetry; and vertical height match. The equipment was removed. The patient tolerated the procedure well without complication. All findings were normal except:  - right true vocal fold with stable, dense hypomobility; resting paramedian; interval improvement in convexity/support of free edge  - touch glottal closure; pliability intact  - interval increase in frequency/sustainability of periodic vibration, though with increased open:closed ratio  - interval decrease in compensatory supraglottic hyperfunction        Assessment:     Toya Majano is a 85 y.o. female with chronic, idiopathic, right vocal fold paralysis. She has made progress following injection augmentation.     Plan:     Reassurance was provided. I do not recommend repeating the injection due to the possibility of resulting in overclosure and worsening of symptoms. Further treatment which she may find beneficial is SLP voice evaluation. We will arrange this in the coming weeks. Should her voice deteriorate, we could consider repeating the injection.    She will follow up with me in about 2 months, or sooner if needed.    All questions were answered, and the patient is in agreement with the plan.    Geraldo Jamil M.D.  [facility]  Department of Otorhinolaryngology and Communication Sciences

## 2018-07-23 ENCOUNTER — CLINICAL SUPPORT (OUTPATIENT)
Dept: REHABILITATION | Facility: HOSPITAL | Age: 83
End: 2018-07-23
Payer: MEDICARE

## 2018-07-23 ENCOUNTER — TELEPHONE (OUTPATIENT)
Dept: SPEECH THERAPY | Facility: HOSPITAL | Age: 83
End: 2018-07-23

## 2018-07-23 DIAGNOSIS — R49.0 DYSPHONIA: ICD-10-CM

## 2018-07-23 DIAGNOSIS — J38.00 VOCAL CORD PARALYSIS: ICD-10-CM

## 2018-07-23 PROCEDURE — 92524 BEHAVRAL QUALIT ANALYS VOICE: CPT | Mod: PN

## 2018-07-23 PROCEDURE — G9172 VOICE GOAL STATUS: HCPCS | Mod: CI,PN

## 2018-07-23 PROCEDURE — G9171 VOICE CURRENT STATUS: HCPCS | Mod: CJ,PN

## 2018-07-23 NOTE — PLAN OF CARE
Outpatient Neurological Rehabilitation   Voice Evaluation  Date: 7/23/2018   Start Time:  1530  Stop Time:  1615    Name: Toya Majano   MRN: [id number]      Therapy Diagnosis:   Encounter Diagnoses   Name Primary?    Vocal cord paralysis     Dysphonia     Physician: Geraldo Jamil MD  Physician Orders: ST evaluate and treat  Medical Diagnosis: vocal fold paralysis and dysphonia    Visit #:  1  Visits Authorized: 12  Date of Evaluation:  7/23/18  Insurance Authorization Period: July 23, 2018 to September 23, 2018  Plan of Care Certification:    7/23/18 to 8/23/18     Procedure Min.   Qualitative Evaluation of Voice and Resonance    45 minutes           Total Minutes: 45  Total Untimed Units: 1  Charges Billed/# of units: 1    Precautions: standard  Subjective    Date of Onset: h/o unilateral VF paralysis 2015 which improved with procedure and speech therapy; recent hoarseness began February 2018. Second procedure 5/25/18.  Current Medical History: Toya Majano is a 85 y.o. female referred for voice evaluation (CPT 41356) by Dr. Jamil.  She presents with complaints of hoarseness which began 3 years ago.  The patient also reported the following complaints:  fatigue with use, changes in modal pitch, difficulty with singing and reduced volume.  Social history: Pt lives with  in [place].  Patient does not work outside the home.     Past Medical History:   Past Medical History:   Diagnosis Date    Allergy     Allergic rhinitis    Anemia     Arthritis     osteoarthritis    Asthma     Atrial fibrillation     Chronic cough     Since onset of right vocal cord paralysis in 2014    Degenerative disc disease     GERD (gastroesophageal reflux disease)     Hyperlipidemia     Hypertension     Kyphosis     Myelodysplasia 2011 to present    Peripheral vascular disease     Thoracic degenerative disc disease     With fractures    Unilateral vocal cord paralysis 2014, 2015    Right TVC (TVF)  "paralysis, lateral position.    Toya Majano  has a past surgical history that includes Surgery for blocked artery in groin (fem-pop; bypass groin to knee per patient) (Left); Cataract surgery (Bilateral); Balloon angioplasty, artery (1995); Joint replacement (Right); Rotator cuff surgery (2000); Hernia repair; Microsuspension laryngoscopy (03/18/2015); and Cataract extraction.    Medical Hx and Allergies: Toya has a current medication list which includes the following prescription(s): albuterol, alprazolam, ammonium lactate, azelastine, betamethasone dipropionate, clobetasol, clotrimazole-betamethasone 1-0.05%, denosumab, diltiazem, dronedarone, eliquis, epoetin chidi, fenofibrate micronized, flovent diskus, furosemide, gabapentin, hydralazine, ipratropium, isosorbide mononitrate, montelukast, mupirocin, pantoprazole, ranolazine, spironolactone, trospium, welchol, and zetia.   Review of patient's allergies indicates:   Allergen Reactions    Adhesive      bleeding    Erythromycin base     Hydrocodone-acetaminophen     Iodinated contrast- oral and iv dye     Penicillins     Sulfamethoxazole-trimethoprim Nausea And Vomiting    Vancomycin Other (See Comments)    Codeine Rash    Contrast media Rash     Imaging: Reported MBSS completed [facility] 7/11/18. Pt stated the results were "normal." ST requested MBSS report via fax.   Prior Level of Function: Independent  Current Level of Function: Independent   Prior Therapy:  Speech therapy from 4/10/15 to 7/10/15  Pain:   0/10  Pain Location / Description: n/a  Nutrition:  Regular and thin liquid diet. Pt reports 10 lb weight loss in 1 year.  Pt stated she currently weighs 94 lbs.  MD reportedly aware. Pt has Boost at home but does not drink it.  ST recommended dietician consult.  Social History:  Pt lives at home with  in [place]. Pt does not work outside of the home.    Patient Therapy Goals:  To improve vocal quality.     Swallowing: Pt reports " no difficulty swallowing. Denies coughing, throat clearing, changes in vocal quality, globus sensation while eating/drinking.    Breathing: diaphragmatic     Smokin packs/day; quit in   EtOH: none reported   Caffeine: avoids caffiene. Coffee is decaf.    Reflux: yes sometimes. Pt enjoys a lot of red gravy.   Water: 32 oz/day     Laryngeal endoscopy OR stroboscopy findings per Dr. Jamil on 18:  Rigid Laryngeal Videostroboscopy (28107):  All findings were normal except:  - right true vocal fold with stable, dense hypomobility; resting paramedian; interval improvement in convexity/support of free edge  - touch glottal closure; pliability intact  - interval increase in frequency/sustainability of periodic vibration, though with increased open:closed ratio  - interval decrease in compensatory supraglottic hyperfunction      Objective      Perceptual assessment:    -Quality: strained, pitch breaks, phonatory breaks, hoarse  -Volume: decreased projection  -Pitch: low F0  -Flexibility: diminished    Habitual respiratory pattern: diaphragmatic.  CAPE-V Overall Score: mild-moderately deviant  MPT (ah > 18s WFL): average 4 seconds  S/Z ratio (ratio of 1.4+ may indicate a degree of vocal fold dysfunction): 1.4    PRAAT Results:   Mean pitch during sustained phonation: 196.2 (average is 225 Hz taken at a distance of 50cm)   Mean volume during sustained phonation: 71.08 dB (average is 60 dB in conversation taken at a distance of 50cm)     Education / Stimulability Trials  Discussed importance of vocal hygiene including: hydration, conservation, reducing caffeine/drying agents and reducing throat clearing, coughing, other phonotraumatic behaviors.  Patient was stimulable for improved voice using cup bubble/ lax vox semi occluded vocal tract exercises.      G-Codes for Voice  Voice  Current status: FCM:  LEVEL 5: Voice occasionally sounds normal with self-monitoring, but there is some situational variation. The  individual¢s ability to participate in vocational, avocational, and social activities requiring voice is rarely affected in low-vocal demand activities, but is occasionally affected in high-vocal demand activities.   - CJ at least 20% < 40% impaired, limited or restricted  Projected status:  FCM:  LEVEL 6: Voice sounds normal most of the time across all settings and situations. Selfmonitoring is consistent when needed. The individual¢s ability to participate in vocational, avocational, and social activities requiring voice is not affected in low vocal demand activities, but is rarely affected in high-vocal demand activities.   -  CI at least 1% but less than 20% impaired, limited or restricted    Treatment   Treatment Time In: n/a  Treatment Time Out: n/a    n/a    Education: POC, role of SLP, vocal hygeine, scheduling/ cancellation policy and insurance limitations  was discussed with pt. Patient and family members expressed understanding.    Home Program: n/a     Assessment     Patient presents with mild -moderate dysphonia secondary to unilateral vocal cord paralysis (post procedure VF are medialized however voice not improved) as diagnosed by Dr. Jamil. Demonstrates impairments including limitations as described in the problem list. Positive prognostic factors include motivation. Negative prognostic factors include age, recurrent diagnosis. She presents with mild-moderate dysphonia c/b hoarseness, decreased pitch range, pitch and phonatory breaks, decreased volume, strain, vocal fatigue. Barriers to progress include none.  Prognosis for continued improvement is fair.     Rehab Potential: fair  Environmental Concerns/Cultural/Spiritual/Developmental/Educational Needs: none  Short Term Goals (4 weeks):   1. Patient will complete SOVT exercises (lax vox) 2x daily to strengthen and balance the intrinsic laryngeal musculature and maximize glottic closure without medial hyperfunction.  2. Patient will  increase awareness for voice conservations behaviors by following the 50/10 rule and reduce voice use in competing noise environments.   3. Pt will complete neck relaxation exercises 10x each per session/at home ind'ly.           4.Pt will recall and utilize vocal hygiene strategies with min A.    5. Pt will reduce/eliminate/substitute throat clearing ind'ly.  6. Pt will participate in PhoRTE exercises to improve vocal cord medialization.           Long Term Goals (by the time of discharge):   1. Patient will implement and adhere to vocal hygiene protocols on a daily basis.  2. Patient and clinician will facilitate changes in vocal function in order to restore functional use of voice for daily occupational, social, and emotional demands.      Plan     Plan of Care Certification Period: 7/23/18 to 8/23/18    Recommended Treatment Plan:  Patient will participate in the [facility] program for speech therapy 2 times per week to address her  voice deficits, improve vocal function, vocal efficiency, ease of phonation, educate patient and their family, and to participate in a home exercise program.    Other Recommendations:   -Continue exercises as discussed in session  -Contact clinician with any further questions  -Dietician consult to address weight loss and possible nutrition supplement.      Therapist's Name:   Angela Britton M.S.CCC-SLP  Speech Language Pathologist     Date: 7/23/2018

## 2018-07-28 ENCOUNTER — OFFICE VISIT (OUTPATIENT)
Dept: URGENT CARE | Facility: CLINIC | Age: 83
End: 2018-07-28
Payer: MEDICARE

## 2018-07-28 VITALS
HEART RATE: 73 BPM | TEMPERATURE: 98 F | RESPIRATION RATE: 16 BRPM | WEIGHT: 98 LBS | OXYGEN SATURATION: 73 % | HEIGHT: 60 IN | BODY MASS INDEX: 19.24 KG/M2 | DIASTOLIC BLOOD PRESSURE: 63 MMHG | SYSTOLIC BLOOD PRESSURE: 139 MMHG

## 2018-07-28 DIAGNOSIS — S60.511A ABRASION OF RIGHT HAND, INITIAL ENCOUNTER: Primary | ICD-10-CM

## 2018-07-28 PROCEDURE — 99213 OFFICE O/P EST LOW 20 MIN: CPT | Mod: S$GLB,,, | Performed by: FAMILY MEDICINE

## 2018-07-28 PROCEDURE — 3078F DIAST BP <80 MM HG: CPT | Mod: CPTII,S$GLB,, | Performed by: FAMILY MEDICINE

## 2018-07-28 PROCEDURE — 3075F SYST BP GE 130 - 139MM HG: CPT | Mod: CPTII,S$GLB,, | Performed by: FAMILY MEDICINE

## 2018-07-28 NOTE — PROGRESS NOTES
Subjective:       Patient ID: Toya Majano is a 85 y.o. female.    Vitals:  height is 5' (1.524 m) and weight is 44.5 kg (98 lb). Her temperature is 98.1 °F (36.7 °C). Her blood pressure is 139/63 and her pulse is 73. Her respiration is 16 and oxygen saturation is 73% (abnormal).     Chief Complaint: Wound Infection (right hand skin tear)    Pt has a skin tear on right hand         Wound Check   She was originally treated 3 to 5 days ago.     Review of Systems   Constitution: Negative for chills and fever.   HENT: Negative for sore throat.    Eyes: Negative for blurred vision.   Cardiovascular: Negative for chest pain.   Respiratory: Negative for shortness of breath.    Skin: Positive for color change and poor wound healing. Negative for rash.   Musculoskeletal: Negative for back pain and joint pain.   Gastrointestinal: Negative for abdominal pain, diarrhea, nausea and vomiting.   Neurological: Negative for headaches.   Psychiatric/Behavioral: The patient is not nervous/anxious.        Objective:      Physical Exam   Constitutional: She is oriented to person, place, and time. She appears well-developed and well-nourished. She is cooperative.  Non-toxic appearance. She does not appear ill. No distress.   HENT:   Head: Normocephalic and atraumatic.   Right Ear: Hearing, tympanic membrane, external ear and ear canal normal.   Left Ear: Hearing, tympanic membrane, external ear and ear canal normal.   Nose: Nose normal. No mucosal edema, rhinorrhea or nasal deformity. No epistaxis. Right sinus exhibits no maxillary sinus tenderness and no frontal sinus tenderness. Left sinus exhibits no maxillary sinus tenderness and no frontal sinus tenderness.   Mouth/Throat: Uvula is midline, oropharynx is clear and moist and mucous membranes are normal. No trismus in the jaw. Normal dentition. No uvula swelling. No oropharyngeal exudate or posterior oropharyngeal erythema.   Eyes: Conjunctivae and lids are normal. Right eye  exhibits no discharge. Left eye exhibits no discharge. No scleral icterus.   Sclera clear bilat   Neck: Trachea normal, normal range of motion, full passive range of motion without pain and phonation normal. Neck supple.   Cardiovascular: Normal rate, regular rhythm, normal heart sounds, intact distal pulses and normal pulses.  Exam reveals no friction rub.    No murmur heard.  Pulmonary/Chest: Effort normal and breath sounds normal. She has no wheezes.   Abdominal: Soft. Normal appearance and bowel sounds are normal. She exhibits no distension, no pulsatile midline mass and no mass. There is no tenderness.   Musculoskeletal: Normal range of motion. She exhibits no edema or deformity.   Right hand first web space with small skin tear , no sign of infection minimal erythema,  FROM     Lymphadenopathy:     She has no cervical adenopathy.   Neurological: She is alert and oriented to person, place, and time. She exhibits normal muscle tone. Coordination normal.   Skin: Skin is warm, dry and intact. She is not diaphoretic. No pallor.   Psychiatric: She has a normal mood and affect. Her speech is normal and behavior is normal. Judgment and thought content normal. Cognition and memory are normal.   Nursing note and vitals reviewed.      Assessment:       1. Abrasion of right hand, initial encounter        Plan:         Abrasion of right hand, initial encounter        Patient advised wound care instruction,   Cont. Bactroban twie daily  Call if persistent drainage or pain

## 2018-07-30 ENCOUNTER — TELEPHONE (OUTPATIENT)
Dept: REHABILITATION | Facility: HOSPITAL | Age: 83
End: 2018-07-30

## 2018-07-31 ENCOUNTER — CLINICAL SUPPORT (OUTPATIENT)
Dept: REHABILITATION | Facility: HOSPITAL | Age: 83
End: 2018-07-31
Payer: MEDICARE

## 2018-07-31 DIAGNOSIS — R49.0 DYSPHONIA: ICD-10-CM

## 2018-07-31 PROCEDURE — 92507 TX SP LANG VOICE COMM INDIV: CPT | Mod: PN

## 2018-08-02 ENCOUNTER — CLINICAL SUPPORT (OUTPATIENT)
Dept: REHABILITATION | Facility: HOSPITAL | Age: 83
End: 2018-08-02
Payer: MEDICARE

## 2018-08-02 DIAGNOSIS — R49.0 DYSPHONIA: ICD-10-CM

## 2018-08-02 PROCEDURE — 92507 TX SP LANG VOICE COMM INDIV: CPT | Mod: PN

## 2018-08-02 NOTE — PROGRESS NOTES
[facility]   Speech and Language Therapy Daily Note  Date:  8/2/2018     Start Time:  1445  Stop Time:  1530    Name: Toya Majano   MRN: [id number]   Therapy Diagnosis:   Encounter Diagnosis   Name Primary?    Dysphonia    Physician: Geraldo Jamil MD  Physician Orders: speech therapy evaluate and treat  Medical Diagnosis: unilateral VF paralysis    Visit #/ Visits Authorized: 3/12  Visits Cancelled: 0  Visits No Show: 0  Date of Evaluation:  7/23/18  Insurance Authorization Period: July 23, 2018 to September 23, 2018  Plan of Care Certification:    7/23/18 to 8/23/18    G-CODE   3/10    Precautions: Standard    Subjective:   Pt reports: Pt pleasant. No complaints.    Response to previous treatment: n/a   Pain Scale:  0/10 on VAS currently.   Pain Location: n/a   Objective:   TIMED  Procedure Min.             UNTIMED  Procedure Min.   Speech- Language- Voice Therapy    45 minutes       Total Minutes: 45  Total Timed Units: 0  Total Untimed Units: 1  Charges Billed/# of units: 1    Short Term Goals: (4 weeks) Current Progress:   1. Patient will complete SOVT exercises (lax vox) 2x daily to strengthen and balance the intrinsic laryngeal musculature and maximize glottic closure without medial hyperfunction. -cup bubbles with voicing x 10 average length 8.2 seconds; average loudness 66.3 dB  -cup bubbles with words x 10  -words without cup x 10     2. Patient will increase awareness for voice conservations behaviors by following the 50/10 rule and reduce voice use in competing noise environments.  -Reviewed. Pt reported understanding and compliance.    3. Pt will complete neck relaxation exercises 10x each per session/at home ind'ly.     -5 exercises x 10 each   4.Pt will recall and utilize vocal hygiene strategies with min A.   -reviewed   5. Pt will reduce/eliminate/substitute throat clearing ind'ly. -reviewed substituting behaviors including drinking water, dry swallow, sniff  swallow     6. Pt will participate in PhoRTE exercises to improve vocal cord medialization.   -loud /ah/ x 10          -average length 3.4 seconds         -average loudness -Not addressed this session.   -/ah/ low to high x 10 average loudness 61.2 dB  -/ah/ high to low x 10 average loudness 63.7 dB  -functional phrases shouting over the fence x 10 average loudness 70.83 dB  -functional phrases authoritative voice x 10 with average loudness of 73.9 dB    Note: improved vocal quality throughout.          Patient Education/Response:   Educated goals on goals and plan of care. Pt reported understanding.   Home Program: PhoRTE program. Reviewed. Handouts provided. 2x day.   Assessment:   Toya is progressing well towards her goals. Pt responded well with improved vocal quality and length of phonation during lax vox exercises. Decreased volume during Phorte exercises today. Reviewed strategies and hygiene. Current goals remain appropriate. Goals to be updated as necessary.   Pt prognosis is Fair. Pt will continue to benefit from skilled outpatient speech and language therapy to address the deficits listed in the problem list on initial evaluation, provide pt/family education and to maximize pt's level of independence in the home and community environment.     Medical necessity is demonstrated by the following IMPAIRMENTS:  Pt's dysphonia impairs her ability to be heard and understood on the telephone which impacts her ability to commmunicate an emergent situation  Barriers to Therapy: none   Pt's spiritual, cultural and educational needs considered and pt agreeable to plan of care and goals.    Functional Communication Measure (FCM):   Severity Modifier for Medicare G-Code:  G-Codes for Voice  Voice  Current status: FCM:  LEVEL 5: Voice occasionally sounds normal with self-monitoring, but there is some situational variation. The individual¢s ability to participate in vocational, avocational, and social activities  requiring voice is rarely affected in low-vocal demand activities, but is occasionally affected in high-vocal demand activities.   - CJ at least 20% < 40% impaired, limited or restricted  Projected status:  FCM:  LEVEL 6: Voice sounds normal most of the time across all settings and situations. Selfmonitoring is consistent when needed. The individual¢s ability to participate in vocational, avocational, and social activities requiring voice is not affected in low vocal demand activities, but is rarely affected in high-vocal demand activities.   -  CI at least 1% but less than 20% impaired, limited or restriction.     Plan:   Continue POC with focus on voice.  Updated POC due 8/23/18.     Angela Britton M.S.CCC-SLP  Speech Language Pathologist   8/2/2018

## 2018-08-02 NOTE — PROGRESS NOTES
[facility]patient [facility]   Speech and Language Therapy Daily Note  Date:  7/31/2018     Start Time:  1445  Stop Time:  1530    Name: Toya Majano   MRN: [id number]   Therapy Diagnosis:   Encounter Diagnosis   Name Primary?    Dysphonia    Physician: Geraldo Jamil MD  Physician Orders: speech therapy evaluate and treat  Medical Diagnosis: unilateral VF paralysis    Visit #/ Visits Authorized: 2/12  Visits Cancelled: 0  Visits No Show: 0  Date of Evaluation:  7/23/18  Insurance Authorization Period: July 23, 2018 to September 23, 2018  Plan of Care Certification:    7/23/18 to 8/23/18    G-CODE   2 /10    Precautions: Standard    Subjective:   Pt reports: Pt pleasant. No complaints.    Response to previous treatment: n/a   Pain Scale:  0/10 on VAS currently.   Pain Location: n/a   Objective:   TIMED  Procedure Min.             UNTIMED  Procedure Min.   Speech- Language- Voice Therapy    45 minutes       Total Minutes: 45  Total Timed Units: 0  Total Untimed Units: 1  Charges Billed/# of units: 1    Short Term Goals: (4 weeks) Current Progress:   1. Patient will complete SOVT exercises (lax vox) 2x daily to strengthen and balance the intrinsic laryngeal musculature and maximize glottic closure without medial hyperfunction. -Not addressed this session.    2. Patient will increase awareness for voice conservations behaviors by following the 50/10 rule and reduce voice use in competing noise environments.  -Introduced   3. Pt will complete neck relaxation exercises 10x each per session/at home ind'ly.     -completed   4.Pt will recall and utilize vocal hygiene strategies with min A.   -reviewed   5. Pt will reduce/eliminate/substitute throat clearing ind'ly. -reviewed substituting behaviors   6. Pt will participate in PhoRTE exercises to improve vocal cord medialization.   -loud /ah/ x 10          -average length 4.9 seconds         -average loudness 65 dB  -/ah/ low to high x 10 average loudness  64.9 dB  -/ah/ high to low x 10 average loudness 65.8  -functional phrases shouting over the fence x 10 average loudness 75.0 dB  -functional phrases authoritative voice x 10 with average loudness of 77.76 dB    Note: improved vocal quality throughout.        Patient Education/Response:   Educated goals on goals and plan of care. Pt reported understanding.   Home Program: PhoRTE program. Reviewed. Handouts provided. 2x day.   Assessment:   Toya is progressing well towards her goals. Established baseline data for PhoRTe program. Reviewed strategies and hygiene. Current goals remain appropriate. Goals to be updated as necessary.   Pt prognosis is Fair. Pt will continue to benefit from skilled outpatient speech and language therapy to address the deficits listed in the problem list on initial evaluation, provide pt/family education and to maximize pt's level of independence in the home and community environment.     Medical necessity is demonstrated by the following IMPAIRMENTS:  Pt's dysphonia impairs her ability to be heard and understood on the telephone which impacts her ability to commmunicate an emergent situation  Barriers to Therapy: none   Pt's spiritual, cultural and educational needs considered and pt agreeable to plan of care and goals.    Functional Communication Measure (FCM):   Severity Modifier for Medicare G-Code:  G-Codes for Voice  Voice  Current status: FCM:  LEVEL 5: Voice occasionally sounds normal with self-monitoring, but there is some situational variation. The individual¢s ability to participate in vocational, avocational, and social activities requiring voice is rarely affected in low-vocal demand activities, but is occasionally affected in high-vocal demand activities.   - CJ at least 20% < 40% impaired, limited or restricted  Projected status:  FCM:  LEVEL 6: Voice sounds normal most of the time across all settings and situations. Selfmonitoring is consistent when needed. The  individual¢s ability to participate in vocational, avocational, and social activities requiring voice is not affected in low vocal demand activities, but is rarely affected in high-vocal demand activities.   -  CI at least 1% but less than 20% impaired, limited or restriction.     Plan:   Continue POC with focus on voice.  Updated POC due 8/23/18.     Angela Britton M.S.CCC-SLP  Speech Language Pathologist   7/31/2018

## 2018-08-07 ENCOUNTER — CLINICAL SUPPORT (OUTPATIENT)
Dept: REHABILITATION | Facility: HOSPITAL | Age: 83
End: 2018-08-07
Payer: MEDICARE

## 2018-08-07 DIAGNOSIS — R49.0 DYSPHONIA: ICD-10-CM

## 2018-08-07 PROCEDURE — 92507 TX SP LANG VOICE COMM INDIV: CPT | Mod: PN

## 2018-08-07 NOTE — PROGRESS NOTES
[facility]patient [facility]   Speech and Language Therapy Daily Note  Date:  8/7/2018     Start Time:  1615  Stop Time:  1700    Name: Toya Majano   MRN: [id number]   Therapy Diagnosis:   Encounter Diagnosis   Name Primary?    Dysphonia    Physician: Geraldo Jamil MD  Physician Orders: speech therapy evaluate and treat  Medical Diagnosis: unilateral VF paralysis    Visit #/ Visits Authorized: 3/12  Visits Cancelled: 0  Visits No Show: 0  Date of Evaluation:  7/23/18  Insurance Authorization Period: July 23, 2018 to September 23, 2018  Plan of Care Certification:    7/23/18 to 8/23/18    G-CODE   4/10    Precautions: Standard    Subjective:   Pt reports: Pt pleasant. No complaints.  Pt reported voice is worse at the end of the day. Note pt has a lot of stress right now due to finding out 2 daughters have cancer. Pt reported completing Phorte exercises 1x a day at home.   Response to previous treatment: n/a   Pain Scale:  0/10 on VAS currently.   Pain Location: n/a   Objective:   TIMED  Procedure Min.             UNTIMED  Procedure Min.   Speech- Language- Voice Therapy    35 minutes       Total Minutes: 35  Total Timed Units: 0  Total Untimed Units: 1  Charges Billed/# of units: 1    Short Term Goals: (4 weeks) Current Progress:   1. Patient will complete SOVT exercises (lax vox) 2x daily to strengthen and balance the intrinsic laryngeal musculature and maximize glottic closure without medial hyperfunction. -cup bubbles with voicing x 10 average length 6.5 seconds  -cup bubbles with words x 10  -words without cup x 10     2. Patient will increase awareness for voice conservations behaviors by following the 50/10 rule and reduce voice use in competing noise environments.  -Pt did not recall.   -Reviewed. Pt reported understanding and compliance.    3. Pt will complete neck relaxation exercises 10x each per session/at home ind'ly.     -5 exercises x 10 each   4.Pt will recall and utilize vocal  hygiene strategies with min A.   -reviewed   5. Pt will reduce/eliminate/substitute throat clearing ind'ly. -reviewed substituting behaviors including drinking water, dry swallow, sniff swallow     6. Pt will participate in PhoRTE exercises to improve vocal cord medialization.   -loud /ah/ x 10          -average length 2.5 seconds         -average loudness 66.1  dB   -/ah/ low to high x 10 average loudness 64.1 dB  -/ah/ high to low x 10 average loudness 62.1 dB  -functional phrases shouting over the fence x 10 average loudness 70.1 dB  -functional phrases authoritative voice x 10 with average loudness of 72.7 dB    Note: improved vocal quality throughout.          Patient Education/Response:   Educated goals on goals and plan of care. Pt reported understanding.   Home Program: PhoRTE program. Reviewed. Handouts provided. 2x day.   Assessment:   Toya is progressing well towards her goals. Pt responded well with decline in vocal quality and length of phonation during lax vox exercises in comparison to last session. Decreased volume during Phorte exercises today. Pt reported voice is worse at the end of the day.  Reviewed strategies and hygiene. Current goals remain appropriate. Goals to be updated as necessary.   Pt prognosis is Fair. Pt will continue to benefit from skilled outpatient speech and language therapy to address the deficits listed in the problem list on initial evaluation, provide pt/family education and to maximize pt's level of independence in the home and community environment.     Medical necessity is demonstrated by the following IMPAIRMENTS:  Pt's dysphonia impairs her ability to be heard and understood on the telephone which impacts her ability to commmunicate an emergent situation  Barriers to Therapy: none   Pt's spiritual, cultural and educational needs considered and pt agreeable to plan of care and goals.    Functional Communication Measure (FCM):   Severity Modifier for Medicare  G-Code:  G-Codes for Voice  Voice  Current status: FCM:  LEVEL 5: Voice occasionally sounds normal with self-monitoring, but there is some situational variation. The individual¢s ability to participate in vocational, avocational, and social activities requiring voice is rarely affected in low-vocal demand activities, but is occasionally affected in high-vocal demand activities.   - CJ at least 20% < 40% impaired, limited or restricted  Projected status:  FCM:  LEVEL 6: Voice sounds normal most of the time across all settings and situations. Selfmonitoring is consistent when needed. The individual¢s ability to participate in vocational, avocational, and social activities requiring voice is not affected in low vocal demand activities, but is rarely affected in high-vocal demand activities.   -  CI at least 1% but less than 20% impaired, limited or restriction.     Plan:   Continue POC with focus on voice.  Updated POC due 8/23/18.     Angela Britton M.S.CCC-SLP  Speech Language Pathologist   8/7/2018

## 2018-08-14 ENCOUNTER — CLINICAL SUPPORT (OUTPATIENT)
Dept: REHABILITATION | Facility: HOSPITAL | Age: 83
End: 2018-08-14
Payer: MEDICARE

## 2018-08-14 DIAGNOSIS — R49.0 DYSPHONIA: ICD-10-CM

## 2018-08-14 PROCEDURE — 92507 TX SP LANG VOICE COMM INDIV: CPT | Mod: PN

## 2018-08-14 NOTE — PROGRESS NOTES
[facility]patient [facility]   Speech and Language Therapy Daily Note  Date:  8/14/2018     Start Time:  1645  Stop Time:  1730    Name: Toya Majano   MRN: [id number]   Therapy Diagnosis:   Encounter Diagnosis   Name Primary?    Dysphonia    Physician: Geraldo Jamil MD  Physician Orders: speech therapy evaluate and treat  Medical Diagnosis: unilateral VF paralysis    Visit #/ Visits Authorized: 5/12  Visits Cancelled: 0  Visits No Show: 0  Date of Evaluation:  7/23/18  Insurance Authorization Period: July 23, 2018 to September 23, 2018  Plan of Care Certification:    7/23/18 to 8/23/18    G-CODE   5/10    Precautions: Standard    Subjective:   Pt reports: Pt pleasant. No complaints.  Pt reported voice is worse at the end of the day. Note pt has a lot of stress right now due to finding out 2 daughters have cancer. Pt reported completing Phorte exercises 1x a day at home.   Response to previous treatment: n/a   Pain Scale:  0/10 on VAS currently.   Pain Location: n/a   Objective:   TIMED  Procedure Min.             UNTIMED  Procedure Min.   Speech- Language- Voice Therapy    35 minutes       Total Minutes: 35  Total Timed Units: 0  Total Untimed Units: 1  Charges Billed/# of units: 1    Short Term Goals: (4 weeks) Current Progress:   1. Patient will complete SOVT exercises (lax vox) 2x daily to strengthen and balance the intrinsic laryngeal musculature and maximize glottic closure without medial hyperfunction. -cup bubbles with voicing x 10 average length 6 seconds  -cup bubbles with words x 10  -words without cup x 10     2. Patient will increase awareness for voice conservations behaviors by following the 50/10 rule and reduce voice use in competing noise environments.  -Pt did not recall.   -Reviewed. Pt reported understanding and compliance.    3. Pt will complete neck relaxation exercises 10x each per session/at home ind'ly.     -5 exercises x 10 each   4.Pt will recall and utilize vocal  hygiene strategies with min A.   -reviewed   5. Pt will reduce/eliminate/substitute throat clearing ind'ly. -reviewed substituting behaviors including drinking water, dry swallow, sniff swallow     6. Pt will participate in PhoRTE exercises to improve vocal cord medialization.   -loud /ah/ x 10          -average length 2 seconds         -average loudness 61.0  dB   -/ah/ low to high x 10   -/ah/ high to low x 10   -functional phrases shouting over the fence x 10  -functional phrases authoritative voice x 10     Note: improved vocal quality throughout.          Patient Education/Response:   Educated goals on goals and plan of care. Pt reported understanding.   Home Program: [facility]E program. Reviewed. Handouts provided. 2x day.   Assessment:   Toya is progressing well towards her goals. Decline in vocal quality and length of phonation during lax vox exercises in comparison to last session. Decreased volume during Phorte exercises today. Pt reported voice is worse at the end of the day.  Reviewed strategies and hygiene. Current goals remain appropriate. Goals to be updated as necessary.   Pt prognosis is Fair. Pt will continue to benefit from skilled outpatient speech and language therapy to address the deficits listed in the problem list on initial evaluation, provide pt/family education and to maximize pt's level of independence in the home and community environment.     Medical necessity is demonstrated by the following IMPAIRMENTS:  Pt's dysphonia impairs her ability to be heard and understood on the telephone which impacts her ability to commmunicate an emergent situation  Barriers to Therapy: none   Pt's spiritual, cultural and educational needs considered and pt agreeable to plan of care and goals.    Functional Communication Measure (FCM):   Severity Modifier for Medicare G-Code:  G-Codes for Voice  Voice  Current status: FCM:  LEVEL 5: Voice occasionally sounds normal with self-monitoring, but there is some  situational variation. The individual¢s ability to participate in vocational, avocational, and social activities requiring voice is rarely affected in low-vocal demand activities, but is occasionally affected in high-vocal demand activities.   - CJ at least 20% < 40% impaired, limited or restricted  Projected status:  FCM:  LEVEL 6: Voice sounds normal most of the time across all settings and situations. Selfmonitoring is consistent when needed. The individual¢s ability to participate in vocational, avocational, and social activities requiring voice is not affected in low vocal demand activities, but is rarely affected in high-vocal demand activities.   -  CI at least 1% but less than 20% impaired, limited or restriction.     Plan:   Continue POC with focus on voice.  Updated POC due 8/23/18.     Angela Britton M.S.CCC-SLP  Speech Language Pathologist   8/14/2018

## 2018-08-16 ENCOUNTER — CLINICAL SUPPORT (OUTPATIENT)
Dept: REHABILITATION | Facility: HOSPITAL | Age: 83
End: 2018-08-16
Payer: MEDICARE

## 2018-08-16 DIAGNOSIS — R49.0 DYSPHONIA: ICD-10-CM

## 2018-08-16 PROCEDURE — 92507 TX SP LANG VOICE COMM INDIV: CPT | Mod: PN

## 2018-08-16 NOTE — PROGRESS NOTES
Outpatient Neurological Rehabilitation   Speech and Language Therapy Daily Note  Date:  8/16/2018     Start Time:  1400  Stop Time:  1445    Name: Toya Majano   MRN: [id number]   Therapy Diagnosis:   Encounter Diagnosis   Name Primary?    Dysphonia    Physician: Geraldo Jamil MD  Physician Orders: speech therapy evaluate and treat  Medical Diagnosis: unilateral VF paralysis    Visit #/ Visits Authorized: 6/12  Visits Cancelled: 0  Visits No Show: 0  Date of Evaluation:  7/23/18  Insurance Authorization Period: July 23, 2018 to September 23, 2018  Plan of Care Certification:    7/23/18 to 8/23/18    G-CODE   6/10    Precautions: Standard    Subjective:   Pt reports: Pt pleasant. No complaints.  Pt reported voice is worse at the end of the day. Note pt has a lot of stress right now due to finding out 2 daughters have cancer. Pt reported completing Phorte exercises 1x a day at home.   Response to previous treatment: n/a   Pain Scale:  0/10 on VAS currently.   Pain Location: n/a   Objective:   TIMED  Procedure Min.             UNTIMED  Procedure Min.   Speech- Language- Voice Therapy    45 minutes       Total Minutes: 45  Total Timed Units: 0  Total Untimed Units: 1  Charges Billed/# of units: 1    Short Term Goals: (4 weeks) Current Progress:   1. Patient will complete SOVT exercises (lax vox) 2x daily to strengthen and balance the intrinsic laryngeal musculature and maximize glottic closure without medial hyperfunction. -cup bubbles with voicing x 10   -cup bubbles with words x 10  -words without cup x 10     2. Patient will increase awareness for voice conservations behaviors by following the 50/10 rule and reduce voice use in competing noise environments.  -Reviewed. Pt reported understanding and compliance.    3. Pt will complete neck relaxation exercises 10x each per session/at home ind'ly.     -5 exercises x 10 each   4.Pt will recall and utilize vocal hygiene strategies with min A.   -reviewed   5.  Pt will reduce/eliminate/substitute throat clearing ind'ly. -reviewed substituting behaviors including drinking water, dry swallow, sniff swallow     6. Pt will participate in PhoRTE exercises to improve vocal cord medialization.   -loud /ah/ x 10          -average length 2.0 seconds         -average loudness 66.1  dB   -/ah/ low to high x 10   -/ah/ high to low x 10   -functional phrases shouting over the fence x 10   -functional phrases authoritative voice x 10    Note: improved vocal quality throughout.        Additional: Attempted /whooo/ with success. Pt able to imitate a high pitch /whoo/ with good vocal quality. Difficulty with /whoo/ in a moderate and low pitch.  Some success noted with yawn sigh.      Patient Education/Response:   Educated goals on goals and plan of care. Pt reported understanding.   Home Program: [facility]E program. Reviewed. Handouts provided. 2x day.   Assessment:   Toya is progressing well towards her goals.  Reviewed strategies and hygiene. Some success with yawn sign, lax vox exercises, and imitation of /whoo/ to achieve decent vocal quality.  Current goals remain appropriate. Goals to be updated as necessary.   Pt prognosis is Fair. Pt will continue to benefit from skilled outpatient speech and language therapy to address the deficits listed in the problem list on initial evaluation, provide pt/family education and to maximize pt's level of independence in the home and community environment.     Medical necessity is demonstrated by the following IMPAIRMENTS:  Pt's dysphonia impairs her ability to be heard and understood on the telephone which impacts her ability to commmunicate an emergent situation  Barriers to Therapy: none   Pt's spiritual, cultural and educational needs considered and pt agreeable to plan of care and goals.    Functional Communication Measure (FCM):   Severity Modifier for Medicare G-Code:  G-Codes for Voice  Voice  Current status: FCM:  LEVEL 5: Voice occasionally  sounds normal with self-monitoring, but there is some situational variation. The individual¢s ability to participate in vocational, avocational, and social activities requiring voice is rarely affected in low-vocal demand activities, but is occasionally affected in high-vocal demand activities.   - CJ at least 20% < 40% impaired, limited or restricted  Projected status:  FCM:  LEVEL 6: Voice sounds normal most of the time across all settings and situations. Selfmonitoring is consistent when needed. The individual¢s ability to participate in vocational, avocational, and social activities requiring voice is not affected in low vocal demand activities, but is rarely affected in high-vocal demand activities.   -  CI at least 1% but less than 20% impaired, limited or restriction.     Plan:   Continue POC with focus on voice.  Updated POC due 8/23/18.     Angela Britton M.S.CCC-SLP  Speech Language Pathologist   8/16/2018

## 2018-08-21 ENCOUNTER — CLINICAL SUPPORT (OUTPATIENT)
Dept: REHABILITATION | Facility: HOSPITAL | Age: 83
End: 2018-08-21
Payer: MEDICARE

## 2018-08-21 DIAGNOSIS — R49.0 DYSPHONIA: ICD-10-CM

## 2018-08-21 PROCEDURE — 92507 TX SP LANG VOICE COMM INDIV: CPT | Mod: PN

## 2018-08-22 NOTE — PROGRESS NOTES
Outpatient Neurological Rehabilitation   Speech and Language Therapy Daily Note  Date:  8/21/2018     Start Time:  1445  Stop Time:  1530    Name: Toya Majano   MRN: [id number]   Therapy Diagnosis:   Encounter Diagnosis   Name Primary?    Dysphonia    Physician: Geraldo Jamil MD  Physician Orders: speech therapy evaluate and treat  Medical Diagnosis: unilateral VF paralysis    Visit #/ Visits Authorized: 7/12  Visits Cancelled: 0  Visits No Show: 0  Date of Evaluation:  7/23/18  Insurance Authorization Period: July 23, 2018 to September 23, 2018  Plan of Care Certification:    7/23/18 to 8/23/18    G-CODE   7/10    Precautions: Standard    Subjective:   Pt reports: Pt pleasant. No complaints.  Pt reported voice is worse at the end of the day.  Pt reported completing some exercises at home.   Response to previous treatment: n/a   Pain Scale:  0/10 on VAS currently.   Pain Location: n/a   Objective:   TIMED  Procedure Min.             UNTIMED  Procedure Min.   Speech- Language- Voice Therapy    45 minutes       Total Minutes: 45  Total Timed Units: 0  Total Untimed Units: 1  Charges Billed/# of units: 1    Short Term Goals: (4 weeks) Current Progress:   1. Patient will complete SOVT exercises (lax vox) 2x daily to strengthen and balance the intrinsic laryngeal musculature and maximize glottic closure without medial hyperfunction. -cup bubbles with voicing x 10   -cup bubbles with words x 10  -words without cup x 10     2. Patient will increase awareness for voice conservations behaviors by following the 50/10 rule and reduce voice use in competing noise environments.  -Reviewed. Pt reported understanding and compliance.    3. Pt will complete neck relaxation exercises 10x each per session/at home ind'ly.     -5 exercises x 10 each   4.Pt will recall and utilize vocal hygiene strategies with min A.   -reviewed   5. Pt will reduce/eliminate/substitute throat clearing ind'ly. -Not addressed this session.        6. Pt will participate in PhoRTE exercises to improve vocal cord medialization.   -loud /whoo/ x 5          -average length -Not addressed this session.          -average loudness -Not addressed this session.   -/whoo/ low to high x 5   -/whoo/ high to low x 5   -functional phrases shouting over the fence x 10   -functional phrases authoritative voice x 10 -Not addressed this session.        Additional: Attempted /whooo/ with success. Pt able to imitate a high pitch /whoo/ with good vocal quality. Difficulty with /whoo/ in a moderate and low pitch.  Some success noted with yawn sigh.      Patient Education/Response:   Educated goals on goals and plan of care. Pt reported understanding.   Home Program: [facility] program. Reviewed. Handouts provided. 2x day.   Assessment:   Toya is progressing well towards her goals.  Reviewed strategies and hygiene. Some success with yawn sign, lax vox exercises, and imitation of /whoo/ to achieve decent vocal quality.  Current goals remain appropriate. Goals to be updated as necessary.   Pt prognosis is Fair. Pt will continue to benefit from skilled outpatient speech and language therapy to address the deficits listed in the problem list on initial evaluation, provide pt/family education and to maximize pt's level of independence in the home and community environment.     Medical necessity is demonstrated by the following IMPAIRMENTS:  Pt's dysphonia impairs her ability to be heard and understood on the telephone which impacts her ability to commmunicate an emergent situation  Barriers to Therapy: none   Pt's spiritual, cultural and educational needs considered and pt agreeable to plan of care and goals.    Functional Communication Measure (FCM):   Severity Modifier for Medicare G-Code:  G-Codes for Voice  Voice  Current status: FCM:  LEVEL 5: Voice occasionally sounds normal with self-monitoring, but there is some situational variation. The individual¢s ability to participate in  vocational, avocational, and social activities requiring voice is rarely affected in low-vocal demand activities, but is occasionally affected in high-vocal demand activities.   - CJ at least 20% < 40% impaired, limited or restricted  Projected status:  FCM:  LEVEL 6: Voice sounds normal most of the time across all settings and situations. Selfmonitoring is consistent when needed. The individual¢s ability to participate in vocational, avocational, and social activities requiring voice is not affected in low vocal demand activities, but is rarely affected in high-vocal demand activities.   -  CI at least 1% but less than 20% impaired, limited or restriction.     Plan:   Continue POC with focus on voice.  Updated POC due 8/23/18.     Angela Britton M.S.CCC-SLP  Speech Language Pathologist   8/21/2018

## 2018-08-23 ENCOUNTER — CLINICAL SUPPORT (OUTPATIENT)
Dept: REHABILITATION | Facility: HOSPITAL | Age: 83
End: 2018-08-23
Payer: MEDICARE

## 2018-08-23 DIAGNOSIS — R49.0 DYSPHONIA: ICD-10-CM

## 2018-08-23 PROCEDURE — 92507 TX SP LANG VOICE COMM INDIV: CPT | Mod: PN

## 2018-08-23 NOTE — PLAN OF CARE
Date: 08/23/2018    SPEECH THERAPY UPDATED PLAN OF TREATMENT    Patient name: Toya Majano  Onset Date:  h/o unilateral VF paralysis 2015 which improved with procedure and speech therapy; recent hoarseness began February 2018. Second procedure 5/25/18.  SOC Date:  7/23/18  Primary Diagnosis:    1. Dysphonia       Treatment Diagnosis:  vocal fold paralysis and dysphonia  Certification Period: 7/23/18 to 9/23/18  Plan of Care Certification Period: 8/23/18 to 9/23/18  Precautions:  fall  Visits from SOC:  8    Updated Assessment:  Pt is progressing towards goals. Stimuable for improved vocal quality during some SOVT exercises. Improved vocal quality during production of /whoo/ in a higher pitch.  Adequate vocal quality achieved at times in a conversational level immediately following SOVT exercise.  Improvement in vocal quality also noted with lax vox exercises. Adequate volume and easy phonation noted during lax lox exercises. Difficulty with breath support.  Voice in conversation is breathy, strained, and low volume. Pt's  cannot hear or understand her when she speaks. Pt is significantly frustrated with her vocal quality.     Previous Short Term Goals Status:     1. Patient will complete SOVT exercises (lax vox) 2x daily to strengthen and balance the intrinsic laryngeal musculature and maximize glottic closure without medial hyperfunction... progressing  2. Patient will increase awareness for voice conservations behaviors by following the 50/10 rule and reduce voice use in competing noise environments... progressing   3. Pt will complete neck relaxation exercises 10x each per session/at home ind'ly... progressing           4.Pt will recall and utilize vocal hygiene strategies with min A... progressing  5. Pt will reduce/eliminate/substitute throat clearing ind'ly... progressing  6. Pt will participate in PhoRTE exercises to improve vocal cord medialization... progressing    Long Term Goal Status:    continue per initial plan of care.  Reasons for Recertification of Therapy:   Pt is progressing towards her goals.  Pt continues with dysphonia c/b breathy, strained, and low volume and would benefit from continued skilled speech therapy services to address vocal deficits.     Certification Period: 7/23/18 to 9/23/18  Recommended Treatment Plan: 2 times per week for 4 weeks: Patient Education, Self Care, Therapeutic Activites and Therapeutic Exercise  Other Recommendations: none at this time    Therapist's Name:   Angela Britton M.S.CCC-SLP  Speech Language Pathologist   Date: 08/23/2018    I CERTIFY THE NEED FOR THESE SERVICES FURNISHED UNDER THIS PLAN OF TREATMENT AND WHILE UNDER MY CARE    Physician's comments: ________________________________________________________________________________________________________________________________________________      Physician's Name: ___________________________________

## 2018-08-23 NOTE — PROGRESS NOTES
Outpatient Neurological Rehabilitation   Speech and Language Therapy Daily Note  Date:  8/23/2018     Start Time:  1400  Stop Time:  1445    Name: Toya Majano   MRN: [id number]   Therapy Diagnosis:   Encounter Diagnosis   Name Primary?    Dysphonia    Physician: Geraldo Jamil MD  Physician Orders: speech therapy evaluate and treat  Medical Diagnosis: unilateral VF paralysis    Visit #/ Visits Authorized: 8/12  Visits Cancelled: 0  Visits No Show: 0  Date of Evaluation:  7/23/18  Insurance Authorization Period: July 23, 2018 to September 23, 2018  Plan of Care Certification:    7/23/18 to 8/23/18    G-CODE   8/10    Precautions: Standard    Subjective:   Pt reports: Pt pleasant. No complaints.  Pt reported voice is worse at the end of the day.  Pt reported completing some exercises at home. Pt is extremely frustrated with her voice. Her  cannot understand her at home.   Response to previous treatment: n/a   Pain Scale:  0/10 on VAS currently.   Pain Location: n/a   Objective:   TIMED  Procedure Min.             UNTIMED  Procedure Min.   Speech- Language- Voice Therapy    45 minutes         Total Minutes: 45  Total Timed Units: 0  Total Untimed Units: 1  Charges Billed/# of units: 1    Short Term Goals: (4 weeks) Current Progress:       1. Patient will complete SOVT exercises (lax vox) 2x daily to strengthen and balance the intrinsic laryngeal musculature and maximize glottic closure without medial hyperfunction. -cup bubbles with voicing x 10 -Not addressed this session.   -cup bubbles with words x 10 -Not addressed this session.   -utilized laryngeal manipulation in conjunction with /whoo/, /u/, /ma/ to introduce each word/phrase x 50   -attempted hum, umm x 10       2. Patient will increase awareness for voice conservations behaviors by following the 50/10 rule and reduce voice use in competing noise environments.  -Reviewed. Pt reported understanding and compliance.   -Pt did not recall.        3. Pt will complete neck relaxation exercises 10x each per session/at home ind'ly.     -5 exercises x 10 each       4.Pt will recall and utilize vocal hygiene strategies with min A.   -reviewed   5. Pt will reduce/eliminate/substitute throat clearing ind'ly. -3 episodes noted today.      6. Pt will participate in PhoRTE exercises to improve vocal cord medialization.   -loud /whoo/ x 20  -/whoo/ low to high x 5  -Not addressed this session.   -/whoo/ high to low x 5 -Not addressed this session.   -functional phrases shouting over the fence x 10 - attempted with significant strain; therefore halted activity  -functional phrases authoritative voice x 10 -attempted with significant strain; therefore halted activity         Pt reported 5 glasses of water so far today.   Additional: Attempted /whooo/ with success. Pt able to imitate a high pitch /whoo/ with good vocal quality. Difficulty with /whoo/ in a moderate and low pitch.  Some success noted with yawn sigh.      Patient Education/Response:   Educated goals on goals and plan of care. Pt reported understanding.   Home Program: [facility] program. Reviewed. Handouts provided. 2x day.   Assessment:   Toya is progressing well towards her goals.  Reviewed strategies and hygiene. Some success with imitation of /whoo/ and laryngeal manipulation to achieve decent vocal quality. Improvement noted in vocal quality during exercises today with max A. Poor vocal quality in conversation remains.  Current goals remain appropriate. Goals to be updated as necessary.   Pt prognosis is Fair. Pt will continue to benefit from skilled outpatient speech and language therapy to address the deficits listed in the problem list on initial evaluation, provide pt/family education and to maximize pt's level of independence in the home and community environment.     Medical necessity is demonstrated by the following IMPAIRMENTS:  Pt's dysphonia impairs her ability to be heard and understood on the  telephone which impacts her ability to commmunicate an emergent situation  Barriers to Therapy: none   Pt's spiritual, cultural and educational needs considered and pt agreeable to plan of care and goals.    Functional Communication Measure (FCM):   Severity Modifier for Medicare G-Code:  G-Codes for Voice  Voice  Current status: FCM:  LEVEL 5: Voice occasionally sounds normal with self-monitoring, but there is some situational variation. The individual¢s ability to participate in vocational, avocational, and social activities requiring voice is rarely affected in low-vocal demand activities, but is occasionally affected in high-vocal demand activities.   - CJ at least 20% < 40% impaired, limited or restricted  Projected status:  FCM:  LEVEL 6: Voice sounds normal most of the time across all settings and situations. Selfmonitoring is consistent when needed. The individual¢s ability to participate in vocational, avocational, and social activities requiring voice is not affected in low vocal demand activities, but is rarely affected in high-vocal demand activities.   -  CI at least 1% but less than 20% impaired, limited or restriction.     Plan:   Continue POC with focus on voice.  Updated POC due 8/23/18.     Angela Britton M.S.CCC-SLP  Speech Language Pathologist   8/23/2018

## 2018-08-29 ENCOUNTER — CLINICAL SUPPORT (OUTPATIENT)
Dept: REHABILITATION | Facility: HOSPITAL | Age: 83
End: 2018-08-29
Payer: MEDICARE

## 2018-08-29 DIAGNOSIS — R49.0 DYSPHONIA: ICD-10-CM

## 2018-08-29 PROCEDURE — 92507 TX SP LANG VOICE COMM INDIV: CPT | Mod: PN

## 2018-08-29 NOTE — PROGRESS NOTES
Outpatient Neurological Rehabilitation   Speech and Language Therapy Daily Note  Date:  8/29/2018     Start Time:  1615  Stop Time:  1700    Name: Toya Majano   MRN: [id number]   Therapy Diagnosis:   Encounter Diagnosis   Name Primary?    Dysphonia    Physician: Geraldo Jamil MD  Physician Orders: speech therapy evaluate and treat  Medical Diagnosis: unilateral VF paralysis    Visit #/ Visits Authorized: 9/12  Visits Cancelled: 0  Visits No Show: 0  Date of Evaluation:  7/23/18  Insurance Authorization Period: July 23, 2018 to September 23, 2018  Plan of Care Certification:    7/23/18 to 8/23/18    G-CODE   9/10    Precautions: Standard    Subjective:   Pt reports: Pt pleasant. No complaints.  Pt reported some improvement in her voice. Pt also reported decreased stress.  Response to previous treatment: n/a   Pain Scale:  0/10 on VAS currently.   Pain Location: n/a   Objective:   TIMED  Procedure Min.             UNTIMED  Procedure Min.   Speech- Language- Voice Therapy    45 minutes         Total Minutes: 45  Total Timed Units: 0  Total Untimed Units: 1  Charges Billed/# of units: 1    Short Term Goals: (4 weeks) Current Progress:       1. Patient will complete SOVT exercises (lax vox) 2x daily to strengthen and balance the intrinsic laryngeal musculature and maximize glottic closure without medial hyperfunction. -cup bubbles without voicing x 10  -cup bubbles with voicing x 10  -cup bubbles with words x 10  -completed hum, umm x 10  -yawn sign x 10  -laryngeal massage  -utilized cup bubbles in conjunction /ma/ to introduce each word/phrase x 50      2. Patient will increase awareness for voice conservations behaviors by following the 50/10 rule and reduce voice use in competing noise environments.  -Reviewed. Pt reported understanding and compliance.   3. Pt will complete neck relaxation exercises 10x each per session/at home ind'ly.     -5 exercises x 10 each to begin the session       4.Pt will recall  and utilize vocal hygiene strategies with min A.   -reviewed   5. Pt will reduce/eliminate/substitute throat clearing ind'ly. -2 episodes noted today.      6. Pt will participate in PhoRTE exercises to improve vocal cord medialization.   -loud /whoo/ x 20 -Not addressed this session.   -/whoo/ low to high x 5  -Not addressed this session.   -/whoo/ high to low x 5 -Not addressed this session.   -functional phrases shouting over the fence x 10 -Not addressed this session.   -functional phrases authoritative voice x 10 --Not addressed this session.           Patient Education/Response:   Educated goals on goals and plan of care. Pt reported understanding.   Home Program: [facility] program. Reviewed. Handouts provided. 2x day.   Assessment:   Toya is progressing well towards her goals.  Reviewed strategies and hygiene.   Significant improvement today noted.  Success attributed to lax vox, /ma/, and then word/phrase. Pt started with one word and increased to 3 word phrases.  Pt stated a few phrases at a time with an adequate confidential voice. When vocal quality declined, returned to lax vox, /ma/, and then word/phrase.  Improvement noted in voice in conversation. Decreased breathiness. Pt stated she is more aware of breath support.  Current goals remain appropriate. Goals to be updated as necessary.     Pt prognosis is Fair. Pt will continue to benefit from skilled outpatient speech and language therapy to address the deficits listed in the problem list on initial evaluation, provide pt/family education and to maximize pt's level of independence in the home and community environment.     Medical necessity is demonstrated by the following IMPAIRMENTS:  Pt's dysphonia impairs her ability to be heard and understood on the telephone which impacts her ability to commmunicate an emergent situation  Barriers to Therapy: none   Pt's spiritual, cultural and educational needs considered and pt agreeable to plan of care and  goals.    Functional Communication Measure (FCM):   Severity Modifier for Medicare G-Code:  G-Codes for Voice  Voice  Current status: FCM:  LEVEL 5: Voice occasionally sounds normal with self-monitoring, but there is some situational variation. The individual¢s ability to participate in vocational, avocational, and social activities requiring voice is rarely affected in low-vocal demand activities, but is occasionally affected in high-vocal demand activities.   - CJ at least 20% < 40% impaired, limited or restricted  Projected status:  FCM:  LEVEL 6: Voice sounds normal most of the time across all settings and situations. Selfmonitoring is consistent when needed. The individual¢s ability to participate in vocational, avocational, and social activities requiring voice is not affected in low vocal demand activities, but is rarely affected in high-vocal demand activities.   -  CI at least 1% but less than 20% impaired, limited or restriction.     Plan:   Continue POC with focus on voice.  Updated POC due 9/23/18.     Angela Britton M.S.CCC-SLP  Speech Language Pathologist   8/29/2018

## 2018-08-30 ENCOUNTER — CLINICAL SUPPORT (OUTPATIENT)
Dept: REHABILITATION | Facility: HOSPITAL | Age: 83
End: 2018-08-30
Payer: MEDICARE

## 2018-08-30 DIAGNOSIS — R49.0 DYSPHONIA: ICD-10-CM

## 2018-08-30 PROCEDURE — 92507 TX SP LANG VOICE COMM INDIV: CPT | Mod: PN

## 2018-08-30 PROCEDURE — G9171 VOICE CURRENT STATUS: HCPCS | Mod: CJ,PN

## 2018-08-30 PROCEDURE — G9172 VOICE GOAL STATUS: HCPCS | Mod: CI,PN

## 2018-08-31 NOTE — PROGRESS NOTES
Outpatient Neurological Rehabilitation   Speech and Language Therapy Daily Note  Date:  8/30/2018     Start Time:  1615  Stop Time:  1700    Name: Toya Majano   MRN: [id number]   Therapy Diagnosis:   Encounter Diagnosis   Name Primary?    Dysphonia    Physician: Geraldo Jamil MD  Physician Orders: speech therapy evaluate and treat  Medical Diagnosis: unilateral VF paralysis    Visit #/ Visits Authorized: 10/12  Visits Cancelled: 0  Visits No Show: 0  Date of Evaluation:  7/23/18  Insurance Authorization Period: July 23, 2018 to September 23, 2018  Plan of Care Certification:    7/23/18 to 8/23/18    G-CODE   10/10    Precautions: Standard    Subjective:   Pt reports: Pt pleasant. No complaints.  Pt reported some improvement in her voice. Pt also reported decreased stress.  Response to previous treatment: n/a   Pain Scale:  0/10 on VAS currently.   Pain Location: n/a   Objective:   TIMED  Procedure Min.             UNTIMED  Procedure Min.   Speech- Language- Voice Therapy    45 minutes         Total Minutes: 45  Total Timed Units: 0  Total Untimed Units: 1  Charges Billed/# of units: 1    Short Term Goals: (4 weeks) Current Progress:       1. Patient will complete SOVT exercises (lax vox) 2x daily to strengthen and balance the intrinsic laryngeal musculature and maximize glottic closure without medial hyperfunction. -cup bubbles without voicing x 10  -cup bubbles with voicing x 10  -cup bubbles with words x 10  -completed hum, umm x 10  -yawn sign x 10  -laryngeal massage  -utilized cup bubbles in conjunction /ma/ to introduce each word/phrase x 50      2. Patient will increase awareness for voice conservations behaviors by following the 50/10 rule and reduce voice use in competing noise environments.  -Reviewed. Pt reported understanding and compliance.   3. Pt will complete neck relaxation exercises 10x each per session/at home ind'ly.     -5 exercises x 10 each to begin the session       4.Pt will  recall and utilize vocal hygiene strategies with min A.   -reviewed   5. Pt will reduce/eliminate/substitute throat clearing ind'ly. -2 episodes noted today.      6. Pt will participate in PhoRTE exercises to improve vocal cord medialization.   -loud /whoo/ x 20 -Not addressed this session.   -/whoo/ low to high x 5  -Not addressed this session.   -/whoo/ high to low x 5 -Not addressed this session.   -functional phrases shouting over the fence x 10 -Not addressed this session.   -functional phrases authoritative voice x 10 --Not addressed this session.           Patient Education/Response:   Educated goals on goals and plan of care. Pt reported understanding.   Home Program: Lax vox exercise, lip trills, /ma/ to words and phrases. Find and utilize that confidential voice.      Assessment:   Toya is progressing well towards her goals.  Reviewed strategies and hygiene.   Improvement today noted in vocal quality.  Success attributed to lax vox, /ma/, and then word/phrase. Pt started with one word and increased to 3 word phrases.  Pt stated a few phrases at a time with an adequate confidential voice. When vocal quality declined, returned to lax vox, /ma/, and then word/phrase.  Improvement noted in voice in conversation. Decreased breathiness. Pt stated she is more aware of breath support.  Current goals remain appropriate. Goals to be updated as necessary.     Pt prognosis is Fair. Pt will continue to benefit from skilled outpatient speech and language therapy to address the deficits listed in the problem list on initial evaluation, provide pt/family education and to maximize pt's level of independence in the home and community environment.     Medical necessity is demonstrated by the following IMPAIRMENTS:  Pt's dysphonia impairs her ability to be heard and understood on the telephone which impacts her ability to commmunicate an emergent situation  Barriers to Therapy: none   Pt's spiritual, cultural and  educational needs considered and pt agreeable to plan of care and goals.    Functional Communication Measure (FCM):   Severity Modifier for Medicare G-Code:  G-Codes for Voice  Voice  Current status: FCM:  LEVEL 5: Voice occasionally sounds normal with self-monitoring, but there is some situational variation. The individual¢s ability to participate in vocational, avocational, and social activities requiring voice is rarely affected in low-vocal demand activities, but is occasionally affected in high-vocal demand activities.   - CJ at least 20% < 40% impaired, limited or restricted  Projected status:  FCM:  LEVEL 6: Voice sounds normal most of the time across all settings and situations. Selfmonitoring is consistent when needed. The individual¢s ability to participate in vocational, avocational, and social activities requiring voice is not affected in low vocal demand activities, but is rarely affected in high-vocal demand activities.   -  CI at least 1% but less than 20% impaired, limited or restriction.     Voice 10th visit  Current status: FCM:  LEVEL 5: Voice occasionally sounds normal with self-monitoring, but there is some situational variation. The individual¢s ability to participate in vocational, avocational, and social activities requiring voice is rarely affected in low-vocal demand activities, but is occasionally affected in high-vocal demand activities.   - CJ   Projected status:  FCM:  LEVEL 6: Voice sounds normal most of the time across all settings and situations. Selfmonitoring is consistent when needed. The individual¢s ability to participate in vocational, avocational, and social activities requiring voice is not affected in low vocal demand activities, but is rarely affected in high-vocal demand activities.   - CI     Plan:   Continue POC with focus on voice.  Updated POC due 9/23/18.     Angela Britton M.S.CCC-SLP  Speech Language Pathologist   8/30/2018

## 2018-09-04 ENCOUNTER — CLINICAL SUPPORT (OUTPATIENT)
Dept: REHABILITATION | Facility: HOSPITAL | Age: 83
End: 2018-09-04
Payer: MEDICARE

## 2018-09-04 DIAGNOSIS — R49.0 DYSPHONIA: ICD-10-CM

## 2018-09-04 PROCEDURE — 92507 TX SP LANG VOICE COMM INDIV: CPT | Mod: PN,KX

## 2018-09-04 NOTE — PROGRESS NOTES
Outpatient Neurological Rehabilitation   Speech and Language Therapy Daily Note  Date:  9/4/2018     Start Time:  1445  Stop Time:  1530    Name: Toya Majano   MRN: [id number]   Therapy Diagnosis:   Encounter Diagnosis   Name Primary?    Dysphonia    Physician: Geraldo Jamil MD  Physician Orders: speech therapy evaluate and treat  Medical Diagnosis: unilateral VF paralysis    Visit #/ Visits Authorized: 11/12  Visits Cancelled: 0  Visits No Show: 0  Date of Evaluation:  7/23/18  Insurance Authorization Period: July 23, 2018 to September 23, 2018  Plan of Care Certification:    7/23/18 to 8/23/18    G-CODE   1/10    Precautions: Standard    Subjective:   Pt reports: Pt pleasant. No complaints.  Pt reported overall some improvement in her voice but still not good.  Response to previous treatment: n/a   Pain Scale:  0/10 on VAS currently.   Pain Location: n/a   Objective:   TIMED  Procedure Min.             UNTIMED  Procedure Min.   Speech- Language- Voice Therapy    45 minutes         Total Minutes: 45  Total Timed Units: 0  Total Untimed Units: 1  Charges Billed/# of units: 1    Short Term Goals: (4 weeks) Current Progress:       1. Patient will complete SOVT exercises (lax vox) 2x daily to strengthen and balance the intrinsic laryngeal musculature and maximize glottic closure without medial hyperfunction. -cup bubbles without voicing x 10  -cup bubbles with voicing x 10 with an average of 4.5 second  -cup bubbles with words x 10  -completed hum, umm x 10  -yawn sign x 10  -lip trills x 10 with mod A  -laryngeal massage  -utilized cup bubbles in conjunction /ma/ to introduce each word/phrase/sentence x 60      2. Patient will increase awareness for voice conservations behaviors by following the 50/10 rule and reduce voice use in competing noise environments.  -Reviewed. Pt reported understanding and compliance.   3. Pt will complete neck relaxation exercises 10x each per session/at home ind'ly.     -5  exercises x 10 each to begin the session       4.Pt will recall and utilize vocal hygiene strategies with min A.   -reviewed   5. Pt will reduce/eliminate/substitute throat clearing ind'ly. -1 episodes noted today.      6. Pt will participate in PhoRTE exercises to improve vocal cord medialization.   -loud /whoo/ x 20 -Not addressed this session.   -/whoo/ low to high x 5  -Not addressed this session.   -/whoo/ high to low x 5 -Not addressed this session.   -functional phrases shouting over the fence x 10 -Not addressed this session.   -functional phrases authoritative voice x 10 --Not addressed this session.           Patient Education/Response:   Educated goals on goals and plan of care. Pt reported understanding.   Home Program: Lax vox exercise, lip trills, /ma/ to words and phrases. Find and utilize that confidential voice.      Assessment:   Toya is progressing well towards her goals.  Reviewed strategies and hygiene.   Improvement today noted in vocal quality.  Success attributed to lax vox, /ma/, and then word/phrase/sentence. Pt started with one word and increased to 5 word sentences.  Adequate confidential voice achieved intermittentely. When vocal quality declined, returned to lax vox, /ma/, and then word/phrase/sentence.  Improvement noted in voice in conversation. Overall decreased breathiness. Pt stated she is more aware of breath support.  Current goals remain appropriate. Goals to be updated as necessary.     Pt prognosis is Fair. Pt will continue to benefit from skilled outpatient speech and language therapy to address the deficits listed in the problem list on initial evaluation, provide pt/family education and to maximize pt's level of independence in the home and community environment.     Medical necessity is demonstrated by the following IMPAIRMENTS:  Pt's dysphonia impairs her ability to be heard and understood on the telephone which impacts her ability to commmunicate an emergent  situation  Barriers to Therapy: none   Pt's spiritual, cultural and educational needs considered and pt agreeable to plan of care and goals.    Functional Communication Measure (FCM):   Severity Modifier for Medicare G-Code:  G-Codes for Voice  Voice  Current status: FCM:  LEVEL 5: Voice occasionally sounds normal with self-monitoring, but there is some situational variation. The individual¢s ability to participate in vocational, avocational, and social activities requiring voice is rarely affected in low-vocal demand activities, but is occasionally affected in high-vocal demand activities.   - CJ at least 20% < 40% impaired, limited or restricted  Projected status:  FCM:  LEVEL 6: Voice sounds normal most of the time across all settings and situations. Selfmonitoring is consistent when needed. The individual¢s ability to participate in vocational, avocational, and social activities requiring voice is not affected in low vocal demand activities, but is rarely affected in high-vocal demand activities.   -  CI at least 1% but less than 20% impaired, limited or restriction.     Voice 10th visit  Current status: FCM:  LEVEL 5: Voice occasionally sounds normal with self-monitoring, but there is some situational variation. The individual¢s ability to participate in vocational, avocational, and social activities requiring voice is rarely affected in low-vocal demand activities, but is occasionally affected in high-vocal demand activities.   - CJ   Projected status:  FCM:  LEVEL 6: Voice sounds normal most of the time across all settings and situations. Selfmonitoring is consistent when needed. The individual¢s ability to participate in vocational, avocational, and social activities requiring voice is not affected in low vocal demand activities, but is rarely affected in high-vocal demand activities.   - CI     Plan:   Continue POC with focus on voice.  Updated POC due 9/23/18.     Angela Britton,  M.S.[facility]-SLP  Speech Language Pathologist   9/4/2018

## 2018-09-06 ENCOUNTER — CLINICAL SUPPORT (OUTPATIENT)
Dept: REHABILITATION | Facility: HOSPITAL | Age: 83
End: 2018-09-06
Payer: MEDICARE

## 2018-09-06 DIAGNOSIS — R49.0 DYSPHONIA: ICD-10-CM

## 2018-09-06 PROCEDURE — 92507 TX SP LANG VOICE COMM INDIV: CPT | Mod: PN,KX

## 2018-09-06 NOTE — PROGRESS NOTES
Outpatient Neurological Rehabilitation   Speech and Language Therapy Daily Note  Date:  9/6/2018     Start Time:  1400  Stop Time:  1445    Name: Toya Majano   MRN: [id number]   Therapy Diagnosis:   Encounter Diagnosis   Name Primary?    Dysphonia    Physician: Geraldo Jamil MD  Physician Orders: speech therapy evaluate and treat  Medical Diagnosis: unilateral VF paralysis    Visit #/ Visits Authorized: 12/12  Visits Cancelled: 0  Visits No Show: 0  Date of Evaluation:  7/23/18  Insurance Authorization Period: July 23, 2018 to September 23, 2018  Plan of Care Certification:    7/23/18 to 8/23/18    G-CODE   2/10    Precautions: Standard    Subjective:   Pt reports: Pt pleasant. Pt reported feeling phlegm in throat.   Response to previous treatment: n/a   Pain Scale:  0/10 on VAS currently.   Pain Location: n/a   Objective:   TIMED  Procedure Min.             UNTIMED  Procedure Min.   Speech- Language- Voice Therapy    45 minutes         Total Minutes: 45  Total Timed Units: 0  Total Untimed Units: 1  Charges Billed/# of units: 1    Short Term Goals: (4 weeks) Current Progress:       1. Patient will complete SOVT exercises (lax vox) 2x daily to strengthen and balance the intrinsic laryngeal musculature and maximize glottic closure without medial hyperfunction. -cup bubbles with voicing x 10   -completed um hum x 10    -lip trills x 7 with mod A  -laryngeal massage  -utilized cup bubbles in conjunction with um hum to introduce each word/phrase x 10 minutes   -Pt self palpated upper lip as tactile cue for resonant voice placement     2. Patient will increase awareness for voice conservations behaviors by following the 50/10 rule and reduce voice use in competing noise environments.  --Not addressed this session.    3. Pt will complete neck relaxation exercises 10x each per session/at home ind'ly.     -5 exercises x 10 each to begin the session       4.Pt will recall and utilize vocal hygiene strategies with  min A.   --Not addressed this session.    5. Pt will reduce/eliminate/substitute throat clearing ind'ly. -4 episodes noted today.     6. Pt will participate in PhoRTE exercises to improve vocal cord medialization.   -functional phrases confidential voice--used intermittently with Lax Vox for 10 minutes       -loud /whoo/ x 20 -Not addressed this session.   -/whoo/ low to high x 5  -Not addressed this session.   -/whoo/ high to low x 5 -Not addressed this session.   -functional phrases shouting over the fence x 10 -Not addressed this session.             Patient Education/Response:   Educated goals on goals and plan of care. Pt reported understanding.   Home Program: Lax vox exercise, lip trills, /um hum/ to words and phrases. Find and utilize confidential voice.      Assessment:   Toya is progressing well towards her goals.   Improvement noted in vocal quality after 20 minutes of vocal warmups.  When vocal quality declined, returned to lax vox, /um hum/, and then word/phrase/sentence.  Improvement noted in voice in conversation at end of session.  Pt stated she is more aware of resonant voice placement.  Current goals remain appropriate. Goals to be updated as necessary.     Pt prognosis is Fair. Pt will continue to benefit from skilled outpatient speech and language therapy to address the deficits listed in the problem list on initial evaluation, provide pt/family education and to maximize pt's level of independence in the home and community environment.     Medical necessity is demonstrated by the following IMPAIRMENTS:  Pt's dysphonia impairs her ability to be heard and understood on the telephone which impacts her ability to commmunicate an emergent situation  Barriers to Therapy: none   Pt's spiritual, cultural and educational needs considered and pt agreeable to plan of care and goals.    Functional Communication Measure (FCM):   Severity Modifier for Medicare G-Code:  G-Codes for Voice  Voice  Current  status: FCM:  LEVEL 5: Voice occasionally sounds normal with self-monitoring, but there is some situational variation. The individual¢s ability to participate in vocational, avocational, and social activities requiring voice is rarely affected in low-vocal demand activities, but is occasionally affected in high-vocal demand activities.   - CJ at least 20% < 40% impaired, limited or restricted  Projected status:  FCM:  LEVEL 6: Voice sounds normal most of the time across all settings and situations. Selfmonitoring is consistent when needed. The individual¢s ability to participate in vocational, avocational, and social activities requiring voice is not affected in low vocal demand activities, but is rarely affected in high-vocal demand activities.   -  CI at least 1% but less than 20% impaired, limited or restriction.     Voice 10th visit  Current status: FCM:  LEVEL 5: Voice occasionally sounds normal with self-monitoring, but there is some situational variation. The individual¢s ability to participate in vocational, avocational, and social activities requiring voice is rarely affected in low-vocal demand activities, but is occasionally affected in high-vocal demand activities.   - CJ   Projected status:  FCM:  LEVEL 6: Voice sounds normal most of the time across all settings and situations. Selfmonitoring is consistent when needed. The individual¢s ability to participate in vocational, avocational, and social activities requiring voice is not affected in low vocal demand activities, but is rarely affected in high-vocal demand activities.   - CI     Plan:   Continue POC with focus on voice.  Updated POC due 9/23/18.     Angela Britton M.S.CCC-SLP  Speech Language Pathologist   9/6/2018

## 2018-09-11 ENCOUNTER — CLINICAL SUPPORT (OUTPATIENT)
Dept: REHABILITATION | Facility: HOSPITAL | Age: 83
End: 2018-09-11
Payer: MEDICARE

## 2018-09-11 DIAGNOSIS — R49.0 DYSPHONIA: ICD-10-CM

## 2018-09-11 PROCEDURE — 92507 TX SP LANG VOICE COMM INDIV: CPT | Mod: PN

## 2018-09-11 NOTE — PROGRESS NOTES
Outpatient Neurological Rehabilitation   Speech and Language Therapy Daily Note  Date:  9/11/2018     Start Time:  1400  Stop Time:  1445     Name: Toya Majano   MRN: [id number]   Therapy Diagnosis:   Encounter Diagnosis   Name Primary?    Dysphonia    Physician: Geraldo Jamil MD  Physician Orders: speech therapy evaluate and treat  Medical Diagnosis: unilateral VF paralysis    Visit #/ Visits Authorized: 13/12  Visits Cancelled: 0  Visits No Show: 0  Date of Evaluation:  7/23/18  Insurance Authorization Period: July 23, 2018 to September 23, 2018  Plan of Care Certification:    7/23/18 to 8/23/18    G-CODE   3/10    Precautions: Standard    Subjective:   Pt reports: Pt pleasant. Pt reported feeling phlegm in throat.   Response to previous treatment: n/a   Pain Scale:  0/10 on VAS currently.   Pain Location: n/a   Objective:   TIMED  Procedure Min.             UNTIMED  Procedure Min.   Speech- Language- Voice Therapy    45 minutes         Total Minutes: 45  Total Timed Units: 0  Total Untimed Units: 1  Charges Billed/# of units: 1    Short Term Goals: (4 weeks) Current Progress:       1. Patient will complete SOVT exercises (lax vox) 2x daily to strengthen and balance the intrinsic laryngeal musculature and maximize glottic closure without medial hyperfunction. -cup bubbles with voicing x 20   -completed um hum x 10    -lip trills x 10 with mod A  -Lax Vox vocal exercises in conjunction with /m/ to introduce each word followed by phrase x 7- achieved confidential voice   -Sentences x 10- intermittent confidential voice  -Conversation level x 40 utterances- intermittent confidential voice     2. Patient will increase awareness for voice conservations behaviors by following the 50/10 rule and reduce voice use in competing noise environments.  --Not addressed this session.    3. Pt will complete neck relaxation exercises 10x each per session/at home ind'ly.     -5 exercises x 10 each to begin the  session       4.Pt will recall and utilize vocal hygiene strategies with min A.   --Not addressed this session.    5. Pt will reduce/eliminate/substitute throat clearing ind'ly. -Multiple episodes noted in beginning of session.     6. Pt will participate in PhoRTE exercises to improve vocal cord medialization.     -loud /whoo/ x 20 -Not addressed this session.   -/whoo/ low to high x 5  -Not addressed this session.   -/whoo/ high to low x 5 -Not addressed this session.   -functional phrases shouting over the fence x 10 -Not addressed this session.             Patient Education/Response:   Educated goals on goals and plan of care. Pt reported understanding.   Home Program: Lax vox exercise, lip trills, /um hum/ to words and phrases. Find and utilize confidential voice.      Assessment:   Toya is progressing well towards her goals.   Improvement noted in vocal quality after 10 minutes of vocal warmups.  Able to achieve adequate vocal quality through vocal hierarchy.  Maintained adequate vocal quality in conversation for 10 minutes.   Current goals remain appropriate. Goals to be updated as necessary.     Pt prognosis is Fair. Pt will continue to benefit from skilled outpatient speech and language therapy to address the deficits listed in the problem list on initial evaluation, provide pt/family education and to maximize pt's level of independence in the home and community environment.     Medical necessity is demonstrated by the following IMPAIRMENTS:  Pt's dysphonia impairs her ability to be heard and understood on the telephone which impacts her ability to commmunicate an emergent situation  Barriers to Therapy: none   Pt's spiritual, cultural and educational needs considered and pt agreeable to plan of care and goals.    Functional Communication Measure (FCM):   Severity Modifier for Medicare G-Code:  G-Codes for Voice  Voice  Current status: FCM:  LEVEL 5: Voice occasionally sounds normal with self-monitoring,  but there is some situational variation. The individual¢s ability to participate in vocational, avocational, and social activities requiring voice is rarely affected in low-vocal demand activities, but is occasionally affected in high-vocal demand activities.   - CJ at least 20% < 40% impaired, limited or restricted  Projected status:  FCM:  LEVEL 6: Voice sounds normal most of the time across all settings and situations. Selfmonitoring is consistent when needed. The individual¢s ability to participate in vocational, avocational, and social activities requiring voice is not affected in low vocal demand activities, but is rarely affected in high-vocal demand activities.   -  CI at least 1% but less than 20% impaired, limited or restriction.     Voice 10th visit  Current status: FCM:  LEVEL 5: Voice occasionally sounds normal with self-monitoring, but there is some situational variation. The individual¢s ability to participate in vocational, avocational, and social activities requiring voice is rarely affected in low-vocal demand activities, but is occasionally affected in high-vocal demand activities.   - CJ   Projected status:  FCM:  LEVEL 6: Voice sounds normal most of the time across all settings and situations. Selfmonitoring is consistent when needed. The individual¢s ability to participate in vocational, avocational, and social activities requiring voice is not affected in low vocal demand activities, but is rarely affected in high-vocal demand activities.   - CI     Plan:   Continue POC with focus on voice.  Updated POC due 9/23/18.     Angela Britton M.S.CCC-SLP  Speech Language Pathologist   9/11/2018

## 2018-09-13 ENCOUNTER — CLINICAL SUPPORT (OUTPATIENT)
Dept: REHABILITATION | Facility: HOSPITAL | Age: 83
End: 2018-09-13
Payer: MEDICARE

## 2018-09-13 DIAGNOSIS — R49.0 DYSPHONIA: ICD-10-CM

## 2018-09-13 PROCEDURE — 92507 TX SP LANG VOICE COMM INDIV: CPT | Mod: PN

## 2018-09-13 NOTE — PROGRESS NOTES
Outpatient Neurological Rehabilitation   Speech and Language Therapy Daily Note  Date:  9/13/2018     Start Time:  1400  Stop Time:  1445     Name: Toya Majano   MRN: [id number]   Therapy Diagnosis:   Encounter Diagnosis   Name Primary?    Dysphonia    Physician: Geraldo Jamil MD  Physician Orders: speech therapy evaluate and treat  Medical Diagnosis: unilateral VF paralysis    Visit #/ Visits Authorized: 14/12  Visits Cancelled: 0  Visits No Show: 0  Date of Evaluation:  7/23/18  Insurance Authorization Period: July 23, 2018 to September 23, 2018  Plan of Care Certification:    9/23/18    G-CODE   4/10    Precautions: Standard    Subjective:   Pt reports: Pt pleasant. Pt reported having a sinus infection with a sore throat.  Pt reported receiving steroid shot and Ipratropium yesterday.  Response to previous treatment: n/a   Pain Scale:  No number given  Pain Location: n/a   Objective:   TIMED  Procedure Min.             UNTIMED  Procedure Min.   Speech- Language- Voice Therapy    45 minutes         Total Minutes: 45  Total Timed Units: 0  Total Untimed Units: 1  Charges Billed/# of units: 1    Short Term Goals: (4 weeks) Current Progress:       1. Patient will complete SOVT exercises (lax vox) 2x daily to strengthen and balance the intrinsic laryngeal musculature and maximize glottic closure without medial hyperfunction. -cup bubbles with voicing x 20   -completed um hum x 10    -lip trills x 10 with mod A  -Lax Vox vocal exercises in conjunction with /m/ to introduce each word followed by phrase x 3- discontinued due to better quality produced in conversation than in exercise    -Reading phrases x 30    -Conversation level x 40 utterances- intermittent confidential voice      2. Patient will increase awareness for voice conservations behaviors by following the 50/10 rule and reduce voice use in competing noise environments.  --Discussed vocal conservation for anniversary party this upcoming Saturday    3. Pt will complete neck relaxation exercises 10x each per session/at home ind'ly.     -5 exercises x 10 each to begin the session       4.Pt will recall and utilize vocal hygiene strategies with min A.   --Not addressed this session.    5. Pt will reduce/eliminate/substitute throat clearing ind'ly. -Throat clearing: 3  -Coughing, blowing nose     6. Pt will participate in PhoRTE exercises to improve vocal cord medialization.     -loud /whoo/ x 20 -Not addressed this session.   -/whoo/ low to high x 5  -Not addressed this session.   -/whoo/ high to low x 5 -Not addressed this session.   -functional phrases shouting over the fence x 10 -Not addressed this session.             Patient Education/Response:   Educated goals on goals and plan of care. Pt reported understanding.   Home Program: Lax vox exercise, lip trills, /um hum/ to words and phrases. Find and utilize confidential voice.      Assessment:   Toya is progressing well towards her goals.  Started session with improved vocal quality despite illness.  Possible improvement due to sinus medication.  Decrease in vocal quality during vocal exercises.  Maintained adequate vocal quality in conversation for majority of session.  Current goals remain appropriate. Goals to be updated as necessary.     Pt prognosis is Fair. Pt will continue to benefit from skilled outpatient speech and language therapy to address the deficits listed in the problem list on initial evaluation, provide pt/family education and to maximize pt's level of independence in the home and community environment.     Medical necessity is demonstrated by the following IMPAIRMENTS:  Pt's dysphonia impairs her ability to be heard and understood on the telephone which impacts her ability to commmunicate an emergent situation  Barriers to Therapy: none   Pt's spiritual, cultural and educational needs considered and pt agreeable to plan of care and goals.    Functional Communication Measure (FCM):    Severity Modifier for Medicare G-Code:  G-Codes for Voice  Voice  Current status: FCM:  LEVEL 5: Voice occasionally sounds normal with self-monitoring, but there is some situational variation. The individual¢s ability to participate in vocational, avocational, and social activities requiring voice is rarely affected in low-vocal demand activities, but is occasionally affected in high-vocal demand activities.   - CJ at least 20% < 40% impaired, limited or restricted  Projected status:  FCM:  LEVEL 6: Voice sounds normal most of the time across all settings and situations. Selfmonitoring is consistent when needed. The individual¢s ability to participate in vocational, avocational, and social activities requiring voice is not affected in low vocal demand activities, but is rarely affected in high-vocal demand activities.   -  CI at least 1% but less than 20% impaired, limited or restriction.     Voice 10th visit  Current status: FCM:  LEVEL 5: Voice occasionally sounds normal with self-monitoring, but there is some situational variation. The individual¢s ability to participate in vocational, avocational, and social activities requiring voice is rarely affected in low-vocal demand activities, but is occasionally affected in high-vocal demand activities.   - CJ   Projected status:  FCM:  LEVEL 6: Voice sounds normal most of the time across all settings and situations. Selfmonitoring is consistent when needed. The individual¢s ability to participate in vocational, avocational, and social activities requiring voice is not affected in low vocal demand activities, but is rarely affected in high-vocal demand activities.   - CI     Plan:   Continue POC with focus on voice.  Updated POC due 9/23/18.     Angela Britton M.S.CCC-SLP  Speech Language Pathologist   9/13/2018

## 2018-09-19 ENCOUNTER — CLINICAL SUPPORT (OUTPATIENT)
Dept: REHABILITATION | Facility: HOSPITAL | Age: 83
End: 2018-09-19
Payer: MEDICARE

## 2018-09-19 DIAGNOSIS — R49.0 DYSPHONIA: ICD-10-CM

## 2018-09-19 PROCEDURE — 92507 TX SP LANG VOICE COMM INDIV: CPT | Mod: PN,KX

## 2018-09-19 NOTE — PROGRESS NOTES
Outpatient Neurological Rehabilitation   Speech and Language Therapy Daily Note  Date:  9/19/2018     Start Time:  1400  Stop Time:  1445     Name: Toya Majano   MRN: [id number]   Therapy Diagnosis:   Encounter Diagnosis   Name Primary?    Dysphonia    Physician: Geraldo Jamil MD  Physician Orders: speech therapy evaluate and treat  Medical Diagnosis: unilateral VF paralysis    Visit #/ Visits Authorized: 15/20  Visits Cancelled: 0  Visits No Show: 0  Date of Evaluation:  7/23/18  Insurance Authorization Period: July 23, 2018 to September 23, 2018  Plan of Care Certification:    9/23/18    G-CODE   5/10    Precautions: Standard    Subjective:   Pt reports: Pt pleasant. Pt reported phlegm returning.   Expectorated a large quantity of phlegm from her throat during the session.  Response to previous treatment: n/a   Pain Scale:  No number given  Pain Location: n/a   Objective:   TIMED  Procedure Min.             UNTIMED  Procedure Min.   Speech- Language- Voice Therapy    45 minutes         Total Minutes: 45  Total Timed Units: 0  Total Untimed Units: 1  Charges Billed/# of units: 1    Short Term Goals: (4 weeks) Current Progress:       1. Patient will complete SOVT exercises (lax vox) 2x daily to strengthen and balance the intrinsic laryngeal musculature and maximize glottic closure without medial hyperfunction. -cup bubbles with voicing x 20   -completed um hum x 10    -lip trills x 10   -Lax Vox vocal exercises in conjunction with /um/ to introduce each word followed by phrase x 10     -Conversation level x 15- utilize hand gesture to cue for confidential voice                -2 redirections to use hand gesture    -Reading phrases -Not addressed this session.      2. Patient will increase awareness for voice conservations behaviors by following the 50/10 rule and reduce voice use in competing noise environments.  -Not addressed this session.    3. Pt will complete neck relaxation exercises 10x each per  session/at home ind'ly.     -5 exercises x 10 each to begin the session       4.Pt will recall and utilize vocal hygiene strategies with min A.   --discussed diet changes to reduce phlegm   5. Pt will reduce/eliminate/substitute throat clearing ind'ly. -Coughing, blowing nose     6. Pt will participate in PhoRTE exercises to improve vocal cord medialization.     -loud /whoo/ x 20 -Not addressed this session.   -/whoo/ low to high x 5  -Not addressed this session.   -/whoo/ high to low x 5 -Not addressed this session.   -functional phrases shouting over the fence x 10 -Not addressed this session.             Patient Education/Response:   Educated goals on goals and plan of care.  Provided resources to pt on diets that can reduce phlegm.  Pt reported understanding.    Home Program: Lax vox exercise, lip trills, /um hum/ to words and phrases. Find and utilize confidential voice.      Assessment:   Toya is progressing well towards her goals.  Pt is able to achieve adequate vocal quality when using hand gesture to cue for confidential voice.  Still has harsh and breathy vocal quality but slight improvement in quality after warm up and relaxation exercises.  Overall decreased vocal quality noted in comparison to last session likely secondary to Bronchitis. Note Current goals remain appropriate. Goals to be updated as necessary.      Pt prognosis is Fair. Pt will continue to benefit from skilled outpatient speech and language therapy to address the deficits listed in the problem list on initial evaluation, provide pt/family education and to maximize pt's level of independence in the home and community environment.     Medical necessity is demonstrated by the following IMPAIRMENTS:  Pt's dysphonia impairs her ability to be heard and understood on the telephone which impacts her ability to commmunicate an emergent situation  Barriers to Therapy: none   Pt's spiritual, cultural and educational needs considered and pt  agreeable to plan of care and goals.    Functional Communication Measure (FCM):   Severity Modifier for Medicare G-Code:  G-Codes for Voice  Voice  Current status: FCM:  LEVEL 5: Voice occasionally sounds normal with self-monitoring, but there is some situational variation. The individual¢s ability to participate in vocational, avocational, and social activities requiring voice is rarely affected in low-vocal demand activities, but is occasionally affected in high-vocal demand activities.   - CJ at least 20% < 40% impaired, limited or restricted  Projected status:  FCM:  LEVEL 6: Voice sounds normal most of the time across all settings and situations. Selfmonitoring is consistent when needed. The individual¢s ability to participate in vocational, avocational, and social activities requiring voice is not affected in low vocal demand activities, but is rarely affected in high-vocal demand activities.   -  CI at least 1% but less than 20% impaired, limited or restriction.     Voice 10th visit  Current status: FCM:  LEVEL 5: Voice occasionally sounds normal with self-monitoring, but there is some situational variation. The individual¢s ability to participate in vocational, avocational, and social activities requiring voice is rarely affected in low-vocal demand activities, but is occasionally affected in high-vocal demand activities.   - CJ   Projected status:  FCM:  LEVEL 6: Voice sounds normal most of the time across all settings and situations. Selfmonitoring is consistent when needed. The individual¢s ability to participate in vocational, avocational, and social activities requiring voice is not affected in low vocal demand activities, but is rarely affected in high-vocal demand activities.   - CI     Plan:   Continue POC with focus on voice.   Updated POC due 9/23/18.   Inquire next session about plan to follow up with the ENT.    Angela Britton M.S.CCC-SLP  Speech Language Pathologist    9/19/2018

## 2018-09-25 ENCOUNTER — CLINICAL SUPPORT (OUTPATIENT)
Dept: REHABILITATION | Facility: HOSPITAL | Age: 83
End: 2018-09-25
Payer: MEDICARE

## 2018-09-25 DIAGNOSIS — R49.0 DYSPHONIA: ICD-10-CM

## 2018-09-25 PROCEDURE — 92507 TX SP LANG VOICE COMM INDIV: CPT | Mod: PN

## 2018-09-25 NOTE — PROGRESS NOTES
Outpatient Neurological Rehabilitation   Speech and Language Therapy Daily Note  Date:  9/25/2018     Start Time:  1445  Stop Time:  1530     Name: Toya Majano   MRN: [id number]   Therapy Diagnosis:   Encounter Diagnosis   Name Primary?    Dysphonia    Physician: Geraldo Jamil MD  Physician Orders: speech therapy evaluate and treat  Medical Diagnosis: unilateral VF paralysis    Visit #/ Visits Authorized: 16/20  Visits Cancelled: 0  Visits No Show: 0  Date of Evaluation:  7/23/18  Insurance Authorization Period: July 23, 2018 to September 23, 2018  Plan of Care Certification:    9/23/18    G-CODE   6/10    Precautions: Standard    Subjective:   Pt reports: Pt pleasant.   Response to previous treatment: n/a   Pain Scale:  No number given  Pain Location: n/a   Objective:   TIMED  Procedure Min.             UNTIMED  Procedure Min.   Speech- Language- Voice Therapy    45 minutes         Total Minutes: 45  Total Timed Units: 0  Total Untimed Units: 1  Charges Billed/# of units: 1    Short Term Goals: (4 weeks) Current Progress:       1. Patient will complete SOVT exercises (lax vox) 2x daily to strengthen and balance the intrinsic laryngeal musculature and maximize glottic closure without medial hyperfunction. -cup bubbles with voicing x 10 with 5 second average   -completed um hum x 10  -lip trills x 10   -Lax Vox vocal exercises in conjunction with /um/ to introduce each word followed by phrase x 10     -Conversation level x 15- utilize hand gesture to cue for confidential voice                -2 redirections to use hand gesture    -Reading phrases -Not addressed this session.      2. Patient will increase awareness for voice conservations behaviors by following the 50/10 rule and reduce voice use in competing noise environments.  -Not addressed this session.    3. Pt will complete neck relaxation exercises 10x each per session/at home ind'ly.     -5 exercises x 10 each to begin the session       4.Pt will  recall and utilize vocal hygiene strategies with min A.   --discussed gargling with salt to reduce phlegm   5. Pt will reduce/eliminate/substitute throat clearing ind'ly. -Coughing, blowing nose     6. Pt will participate in PhoRTE exercises to improve vocal cord medialization.   -yawn sigh x 5  -loud /whoo/ x 20 -Not addressed this session.   -/whoo/ low to high x 5  -Not addressed this session.   -/whoo/ high to low x 5 -Not addressed this session.   -functional phrases shouting over the fence x 10 -Not addressed this session.             Patient Education/Response:   Educated goals on goals and plan of care.  Discussed a plan to follow up with the ENT.  Pt reported understanding.      Home Program: Lax vox exercise, lip trills, /um hum/ to words and phrases. Find and utilize confidential voice.      Assessment:   Toya is progressing well towards her goals.  Pt has poor breath support and can sustain phonation for a maximum of six seconds during Lax Vox exercises.  However, the pt can achieve adequate vocal quality when cued to inhale before speaking and decrease rate of speech.  Hand gesture continues to help with cueing. Overall, improvement in vocal quality since initial evaluation; increase in volume and decreased breathiness; however, mild-moderate dysphonia remains. Current goals remain appropriate. Goals to be updated as necessary.      Pt prognosis is Fair. Pt will continue to benefit from skilled outpatient speech and language therapy to address the deficits listed in the problem list on initial evaluation, provide pt/family education and to maximize pt's level of independence in the home and community environment.     Medical necessity is demonstrated by the following IMPAIRMENTS:  Pt's dysphonia impairs her ability to be heard and understood on the telephone which impacts her ability to commmunicate an emergent situation  Barriers to Therapy: none   Pt's spiritual, cultural and educational needs  "considered and pt agreeable to plan of care and goals.    Functional Communication Measure (FCM):   Severity Modifier for Medicare G-Code:  G-Codes for Voice  Voice  Current status: FCM:  LEVEL 5: Voice occasionally sounds normal with self-monitoring, but there is some situational variation. The individual¢s ability to participate in vocational, avocational, and social activities requiring voice is rarely affected in low-vocal demand activities, but is occasionally affected in high-vocal demand activities.   - CJ at least 20% < 40% impaired, limited or restricted  Projected status:  FCM:  LEVEL 6: Voice sounds normal most of the time across all settings and situations. Selfmonitoring is consistent when needed. The individual¢s ability to participate in vocational, avocational, and social activities requiring voice is not affected in low vocal demand activities, but is rarely affected in high-vocal demand activities.   -  CI at least 1% but less than 20% impaired, limited or restriction.     Voice 10th visit  Current status: FCM:  LEVEL 5: Voice occasionally sounds normal with self-monitoring, but there is some situational variation. The individual¢s ability to participate in vocational, avocational, and social activities requiring voice is rarely affected in low-vocal demand activities, but is occasionally affected in high-vocal demand activities.   - CJ   Projected status:  FCM:  LEVEL 6: Voice sounds normal most of the time across all settings and situations. Selfmonitoring is consistent when needed. The individual¢s ability to participate in vocational, avocational, and social activities requiring voice is not affected in low vocal demand activities, but is rarely affected in high-vocal demand activities.   - CI     Plan:   Continue POC with focus on voice.   Updated POC due 9/23/18.     ROME Gomez.,  Clinician    "I, Angela Britton, certify that I was present in the " "room directing the student and service delivery and guiding them using my skilled judgement. As the co-signing therapist, I have reviewed the student's documentation, and am responsible for the treatment, assessment and plan."     Angela Britton M.S.CCC-SLP  Speech Language Pathologist   9/25/2018   "

## 2018-09-27 ENCOUNTER — CLINICAL SUPPORT (OUTPATIENT)
Dept: REHABILITATION | Facility: HOSPITAL | Age: 83
End: 2018-09-27
Payer: MEDICARE

## 2018-09-27 DIAGNOSIS — R49.0 DYSPHONIA: ICD-10-CM

## 2018-09-27 PROCEDURE — 92507 TX SP LANG VOICE COMM INDIV: CPT | Mod: PN,KX

## 2018-09-27 NOTE — PROGRESS NOTES
Outpatient Neurological Rehabilitation   Speech and Language Therapy Daily Note  Date:  9/27/2018     Start Time:  1400  Stop Time:  1445     Name: Toya Majano   MRN: [id number]   Therapy Diagnosis:   Encounter Diagnosis   Name Primary?    Dysphonia    Physician: Geraldo Jamil MD  Physician Orders: speech therapy evaluate and treat  Medical Diagnosis: unilateral VF paralysis    Visit #/ Visits Authorized: 17/20  Visits Cancelled: 0  Visits No Show: 0  Date of Evaluation:  7/23/18  Insurance Authorization Period: July 23, 2018 to September 23, 2018  Plan of Care Certification:    9/23/18    G-CODE   7/10    Precautions: Standard    Subjective:   Pt reports: Pt pleasant.   Response to previous treatment: n/a   Pain Scale:  No number given  Pain Location: n/a   Objective:   TIMED  Procedure Min.             UNTIMED  Procedure Min.   Speech- Language- Voice Therapy    45 minutes         Total Minutes: 45  Total Timed Units: 0  Total Untimed Units: 1  Charges Billed/# of units: 1    Short Term Goals: (4 weeks) Current Progress:       1. Patient will complete SOVT exercises (lax vox) 2x daily to strengthen and balance the intrinsic laryngeal musculature and maximize glottic closure without medial hyperfunction. -cup bubbles with voicing x 10 with 5 second average   -sustained /s/ x 5 with 6 second average  -completed um hum x 10  -lip trills x 10   -Lax Vox vocal exercises in conjunction with /um/ to introduce each word followed by phrase x 10     -Conversation level x 7 minutes              -Intermittently achieved adequate vocal quality                -Reading phrases -Not addressed this session.      2. Patient will increase awareness for voice conservations behaviors by following the 50/10 rule and reduce voice use in competing noise environments.  -Not addressed this session.    3. Pt will complete neck relaxation exercises 10x each per session/at home ind'ly.     -5 exercises x 10 each to begin the  session       4.Pt will recall and utilize vocal hygiene strategies with min A.   -increase water intake, decrease caffeine intake   5. Pt will reduce/eliminate/substitute throat clearing ind'ly. -Coughing, blowing nose  -Throat clearing x 4     6. Pt will participate in PhoRTE exercises to improve vocal cord medialization.   -yawn sigh x 5    -loud /whoo/ x 20 -Not addressed this session.   -/whoo/ low to high x 5  -Not addressed this session.   -/whoo/ high to low x 5 -Not addressed this session.   -functional phrases shouting over the fence x 10 -Not addressed this session.             Patient Education/Response:   Educated goals on goals and plan of care.  Educated and counseled pt on progress since initial evaluation and self-perception on progress.  Reeducated on home program.  Discussed meeting with current ENT and possibly getting second opinion from another ENT.  Pt reported understanding.      Home Program: Lax vox exercise, lip trills, /um hum/ to words and phrases. Find and utilize confidential voice.      Assessment:   Toya is progressing well towards her goals.  Successfully produced lip trills with phonation.  Introduced sustained /s/ exercise to increase breath support.  Achieved improved vocal quality intermittently towards the end of today's session.  Current goals remain appropriate. Goals to be updated as necessary.      Pt prognosis is Fair. Pt will continue to benefit from skilled outpatient speech and language therapy to address the deficits listed in the problem list on initial evaluation, provide pt/family education and to maximize pt's level of independence in the home and community environment.     Medical necessity is demonstrated by the following IMPAIRMENTS:  Pt's dysphonia impairs her ability to be heard and understood on the telephone which impacts her ability to commmunicate an emergent situation  Barriers to Therapy: none   Pt's spiritual, cultural and educational needs considered  "and pt agreeable to plan of care and goals.    Functional Communication Measure (FCM):   Severity Modifier for Medicare G-Code:  G-Codes for Voice  Voice  Current status: FCM:  LEVEL 5: Voice occasionally sounds normal with self-monitoring, but there is some situational variation. The individual¢s ability to participate in vocational, avocational, and social activities requiring voice is rarely affected in low-vocal demand activities, but is occasionally affected in high-vocal demand activities.   - CJ at least 20% < 40% impaired, limited or restricted  Projected status:  FCM:  LEVEL 6: Voice sounds normal most of the time across all settings and situations. Selfmonitoring is consistent when needed. The individual¢s ability to participate in vocational, avocational, and social activities requiring voice is not affected in low vocal demand activities, but is rarely affected in high-vocal demand activities.   -  CI at least 1% but less than 20% impaired, limited or restriction.     Voice 10th visit  Current status: FCM:  LEVEL 5: Voice occasionally sounds normal with self-monitoring, but there is some situational variation. The individual¢s ability to participate in vocational, avocational, and social activities requiring voice is rarely affected in low-vocal demand activities, but is occasionally affected in high-vocal demand activities.   - CJ   Projected status:  FCM:  LEVEL 6: Voice sounds normal most of the time across all settings and situations. Selfmonitoring is consistent when needed. The individual¢s ability to participate in vocational, avocational, and social activities requiring voice is not affected in low vocal demand activities, but is rarely affected in high-vocal demand activities.   - CI     Plan:   Continue POC with focus on voice.   Updated POC due 9/23/18.   Follow up with ENT.     ROME Gomez.,  Clinician    "IAngela, certify that I was " "present in the room directing the student and service delivery and guiding them using my skilled judgement. As the co-signing therapist, I have reviewed the student's documentation, and am responsible for the treatment, assessment and plan."     Angela Britton M.S.CCC-SLP  Speech Language Pathologist   9/27/2018   "

## 2018-10-02 ENCOUNTER — CLINICAL SUPPORT (OUTPATIENT)
Dept: REHABILITATION | Facility: HOSPITAL | Age: 83
End: 2018-10-02
Payer: MEDICARE

## 2018-10-02 DIAGNOSIS — R49.0 DYSPHONIA: ICD-10-CM

## 2018-10-02 PROCEDURE — 92507 TX SP LANG VOICE COMM INDIV: CPT | Mod: PN

## 2018-10-02 NOTE — PROGRESS NOTES
Outpatient Neurological Rehabilitation   Speech and Language Therapy Daily Note  Date:  10/2/2018     Start Time:  1400  Stop Time:  1445     Name: Toya Majano   MRN: [id number]   Therapy Diagnosis:   Encounter Diagnosis   Name Primary?    Dysphonia    Physician: Geraldo Jamil MD  Physician Orders: speech therapy evaluate and treat  Medical Diagnosis: unilateral VF paralysis    Visit #/ Visits Authorized: 18/20  Visits Cancelled: 0  Visits No Show: 0  Date of Evaluation:  7/23/18  Insurance Authorization Period: July 23, 2018 to September 23, 2018  Plan of Care Certification:    9/23/18    G-CODE   8/10    Precautions: Standard    Subjective:   Pt reports: Pt pleasant. Reported feeling frustrated about rate of progress.  Response to previous treatment: n/a   Pain Scale:  No number given  Pain Location: n/a   Objective:   TIMED  Procedure Min.             UNTIMED  Procedure Min.   Speech- Language- Voice Therapy    45 minutes         Total Minutes: 45  Total Timed Units: 0  Total Untimed Units: 1  Charges Billed/# of units: 1    Short Term Goals: (4 weeks) Current Progress:       1. Patient will complete SOVT exercises (lax vox) 2x daily to strengthen and balance the intrinsic laryngeal musculature and maximize glottic closure without medial hyperfunction. -cup bubbles with voicing x 10 with 5 second average   -sustained /s/ x 5 with 5 second average  -Lax Vox vocal exercises in conjunction with /m/ to introduce each word followed by phrase x 20           -15 produced with adequate vocal quality, 20 with min A    -completed um hum  -Not addressed this session.   -lip trills -Not addressed this session.     -Conversation level x 7 minutes              -Overall achieved adequate vocal quality                -Reading phrases -Not addressed this session.      2. Patient will increase awareness for voice conservations behaviors by following the 50/10 rule and reduce voice use in competing noise environments.   -Not addressed this session.    3. Pt will complete neck relaxation exercises 10x each per session/at home ind'ly.     -5 exercises x 10 each to begin the session       4.Pt will recall and utilize vocal hygiene strategies with min A.   -increase water intake, decrease caffeine intake   5. Pt will reduce/eliminate/substitute throat clearing ind'ly. -Coughing, blowing nose  -Throat clearing x 7     6. Pt will participate in PhoRTE exercises to improve vocal cord medialization.   -yawn sigh x 5 -Not addressed this session.   -loud /whoo/ x 20 -Not addressed this session.   -/whoo/ low to high x 5  -Not addressed this session.   -/whoo/ high to low x 5 -Not addressed this session.   -functional phrases shouting over the fence x 10 -Not addressed this session.             Patient Education/Response:   Educated goals on goals and plan of care.  Educated and counseled pt on progress since initial evaluation and self-perception on progress.  Reeducated on home program.  Discussed again meeting with current ENT and possibly getting second opinion from another ENT.  Pt reported understanding.      Home Program: Lax vox exercise, lip trills, /um hum/ to words and phrases. Find and utilize confidential voice.      Assessment:   Toya is progressing well towards her goals.  Able to extend Lax Vox for 9 seconds with strain.  Increase in vocal quality after Lax Vox.  Increase in volume and decreased breathiness after SOVT exercise.  Current goals remain appropriate. Goals to be updated as necessary.      Pt prognosis is Fair. Pt will continue to benefit from skilled outpatient speech and language therapy to address the deficits listed in the problem list on initial evaluation, provide pt/family education and to maximize pt's level of independence in the home and community environment.     Medical necessity is demonstrated by the following IMPAIRMENTS:  Pt's dysphonia impairs her ability to be heard and understood on the  telephone which impacts her ability to commmunicate an emergent situation  Barriers to Therapy: none   Pt's spiritual, cultural and educational needs considered and pt agreeable to plan of care and goals.    Functional Communication Measure (FCM):   Severity Modifier for Medicare G-Code:  G-Codes for Voice  Voice  Current status: FCM:  LEVEL 5: Voice occasionally sounds normal with self-monitoring, but there is some situational variation. The individual¢s ability to participate in vocational, avocational, and social activities requiring voice is rarely affected in low-vocal demand activities, but is occasionally affected in high-vocal demand activities.   - CJ at least 20% < 40% impaired, limited or restricted  Projected status:  FCM:  LEVEL 6: Voice sounds normal most of the time across all settings and situations. Selfmonitoring is consistent when needed. The individual¢s ability to participate in vocational, avocational, and social activities requiring voice is not affected in low vocal demand activities, but is rarely affected in high-vocal demand activities.   -  CI at least 1% but less than 20% impaired, limited or restriction.     Voice 10th visit  Current status: FCM:  LEVEL 5: Voice occasionally sounds normal with self-monitoring, but there is some situational variation. The individual¢s ability to participate in vocational, avocational, and social activities requiring voice is rarely affected in low-vocal demand activities, but is occasionally affected in high-vocal demand activities.   - CJ   Projected status:  FCM:  LEVEL 6: Voice sounds normal most of the time across all settings and situations. Selfmonitoring is consistent when needed. The individual¢s ability to participate in vocational, avocational, and social activities requiring voice is not affected in low vocal demand activities, but is rarely affected in high-vocal demand activities.   - CI     Plan:   Continue POC with  "focus on voice.   Updated POC due 10/9/18.   Follow up with ENT to re-assess vocal cord function.     JUAN MANUEL Gomez,  Clinician    "I, Angela Britton, certify that I was present in the room directing the student and service delivery and guiding them using my skilled judgement. As the co-signing therapist, I have reviewed the student's documentation, and am responsible for the treatment, assessment and plan."     Angela Britton M.S.CCC-SLP  Speech Language Pathologist   10/2/2018   "

## 2018-10-04 ENCOUNTER — CLINICAL SUPPORT (OUTPATIENT)
Dept: REHABILITATION | Facility: HOSPITAL | Age: 83
End: 2018-10-04
Payer: MEDICARE

## 2018-10-04 DIAGNOSIS — R49.0 DYSPHONIA: ICD-10-CM

## 2018-10-04 PROCEDURE — G9173 VOICE D/C STATUS: HCPCS | Mod: CM,PN

## 2018-10-04 PROCEDURE — G9172 VOICE GOAL STATUS: HCPCS | Mod: CM,PN

## 2018-10-04 PROCEDURE — 92507 TX SP LANG VOICE COMM INDIV: CPT | Mod: PN

## 2018-10-04 NOTE — PROGRESS NOTES
Outpatient Neurological Rehabilitation   Speech and Language Therapy Daily Note  Date:  10/4/2018     Start Time:  1400  Stop Time:  1445     Name: Toya Majano   MRN: [id number]   Therapy Diagnosis:   Encounter Diagnosis   Name Primary?    Dysphonia    Physician: Geraldo Jamil MD  Physician Orders: speech therapy evaluate and treat  Medical Diagnosis: unilateral VF paralysis    Visit #/ Visits Authorized: 19/20  Visits Cancelled: 0  Visits No Show: 0  Date of Evaluation:  7/23/18  Insurance Authorization Period: July 23, 2018 to September 23, 2018  Plan of Care Certification:    9/23/18    G-CODE   9/10    Precautions: Standard    Subjective:   Pt reports: Pt pleasant. Reported feeling frustrated about rate of progress.  Treatment: n/a   Pain Scale:  No number given  Pain Location: n/a   Objective:   TIMED  Procedure Min.             UNTIMED  Procedure Min.   Speech- Language- Voice Therapy    45 minutes         Total Minutes: 45  Total Timed Units: 0  Total Untimed Units: 1  Charges Billed/# of units: 1    Short Term Goals: (4 weeks) Current Progress:       1. Patient will complete SOVT exercises (lax vox) 2x daily to strengthen and balance the intrinsic laryngeal musculature and maximize glottic closure without medial hyperfunction. -cup bubbles with voicing x 10 with 5 second average   -Lax Vox vocal exercises in conjunction with /m/ to introduce each word followed by phrase x 10          -15 produced with adequate vocal quality, 20 with min A      -sustained /s/ x 5 -Not addressed this session.   -completed um hum  -Not addressed this session.   -Conversation level x 7 minutes   -Reading phrases -Not addressed this session.      2. Patient will increase awareness for voice conservations behaviors by following the 50/10 rule and reduce voice use in competing noise environments.  -Not addressed this session.    3. Pt will complete neck relaxation exercises 10x each per session/at home ind'ly.     -5  exercises x 10 each to begin the session       4.Pt will recall and utilize vocal hygiene strategies with min A.      5. Pt will reduce/eliminate/substitute throat clearing ind'ly. -Coughing, blowing nose  -Throat clearing x 6     6. Pt will participate in PhoRTE exercises to improve vocal cord medialization.   -yawn sigh x 5 -Not addressed this session.   -loud /whoo/ x 20 -Not addressed this session.   -/whoo/ low to high x 5  -Not addressed this session.   -/whoo/ high to low x 5 -Not addressed this session.   -functional phrases shouting over the fence x 10 -Not addressed this session.             Patient Education/Response:   Educated goals on goals and plan of care.  Educated and counseled pt on progress since initial evaluation and self-perception on progress.  Reeducated on home program.  Discussed again meeting with ENT.  Pt reported understanding.      Home Program: Lax vox exercise, lip trills, /um hum/ to words and phrases. Find and utilize confidential voice.      Assessment:   Toya is progressing well towards her goals.  Administered laryngeal massage to increase vocal quality.  Chin tuck and head turn to the right intermittently provided minimal improvement in vocal quality.  Current goals remain appropriate. Goals to be updated as necessary.      Pt prognosis is Fair. Pt will continue to benefit from skilled outpatient speech and language therapy to address the deficits listed in the problem list on initial evaluation, provide pt/family education and to maximize pt's level of independence in the home and community environment.     Medical necessity is demonstrated by the following IMPAIRMENTS:  Pt's dysphonia impairs her ability to be heard and understood on the telephone which impacts her ability to commmunicate an emergent situation  Barriers to Therapy: none   Pt's spiritual, cultural and educational needs considered and pt agreeable to plan of care and goals.    Functional Communication Measure  (FCM):   Severity Modifier for Medicare G-Code:  G-Codes for Voice  Voice  Current status: FCM:  LEVEL 2: Voice is not functional for communication most of the time. Alternative meansfor communicating must be used most of the time. The individual¢s participation invocational, avocational, and social activities is significantly limited all of the time.   -    Projected status:  FCM:  LEVEL 2: Voice is not functional for communication most of the time. Alternative meansfor communicating must be used most of the time. The individual¢s participation invocational, avocational, and social activities is significantly limited all of the time.  172 -      Voice 10th visit  Current status: FCM:  LEVEL 2: Voice is not functional for communication most of the time. Alternative meansfor communicating must be used most of the time. The individual¢s participation invocational, avocational, and social activities is significantly limited all of the time.   -    Projected status:  FCM:  LEVEL 2: Voice is not functional for communication most of the time. Alternative meansfor communicating must be used most of the time. The individual¢s participation invocational, avocational, and social activities is significantly limited all of the time.   -      Voice discharge  Projected status:  FCM:  LEVEL 2: Voice is not functional for communication most of the time. Alternative meansfor communicating must be used most of the time. The individual¢s participation invocational, avocational, and social activities is significantly limited all of the time.   - CM   Discharge status:  FCM:  LEVEL 2: Voice is not functional for communication most of the time. Alternative meansfor communicating must be used most of the time. The individual¢s participation invocational, avocational, and social activities is significantly limited all of the time.   -         Plan:   Discharge today.   Follow up with ENT.     Ereeni  "JUAN MANUEL Zavala,  Clinician    "I, Angela Britton, certify that I was present in the room directing the student and service delivery and guiding them using my skilled judgement. As the co-signing therapist, I have reviewed the student's documentation, and am responsible for the treatment, assessment and plan."     Angela Britton M.S.CCC-SLP  Speech Language Pathologist   10/4/2018       _________________________________________________________________________---    REHAB SERVICES OUTPATIENT DISCHARGE SUMMARY  Speech Therapy      Name:  Toya Majano  Date:  10/4/18  Date of Evaluation:  7/23/198  Physician:  Dr. Buddy Jamil  Total # Of Visits:  19    Diagnosis:    1. Dysphonia         Physical/Functional Status:  At time of discharge, patient was able to see above note dated 10/4/18 and last plan of care dated 9/27/18.    The patient is to be discharged from our Therapy service for the following reason(s):  Partially met goals.     Degree of Goal Achievement:  Patient has partially met goals    Patient Education:  Educated and counseled pt on progress since initial evaluation and self-perception on progress.  Reeducated on home program.  Discussed again meeting with ENT.  Pt reported understanding.      Discharge Plan:  Home Program:   Lax vox exercise, lip trills, /um hum/ to words and phrases. Find and utilize confidential voice. Follow up with ENT to reassess vocal cord function.     Angela Britton M.S.CCC-SLP  Speech Language Pathologist     "

## 2018-10-05 PROBLEM — R49.0 DYSPHONIA: Status: RESOLVED | Noted: 2018-08-02 | Resolved: 2018-10-05

## 2018-10-05 NOTE — PLAN OF CARE
Date: 9/27/18    SPEECH THERAPY UPDATED PLAN OF TREATMENT    Patient name: Toya Majano  Onset Date:  h/o unilateral VF paralysis 2015 which improved with procedure and speech therapy; recent hoarseness began February 2018. Second procedure 5/25/18.  SOC Date:  7/23/18  Primary Diagnosis:    1. Dysphonia       Treatment Diagnosis:  dysphonia  Certification Period:  July 23, 2018 to October 11, 2018  Plan of Care Certification Period: 9/23/18 to 10/9/18  Precautions:  Fall, standard  Visits from SOC:  17  Functional Level Prior to SOC:  Cane to walk.  Difficulty communicating due to significant dysphonia.     Updated Assessment:  Since initial evaluation, pt has improved her vocal quality.  Pt presents with increased volume, decreased breathiness, increased flexibility, 2 second increase in sustained phonation, increased knowledge and improvement of vocal hygiene and vocal behaviors. Decreased throat clearing, increased water intake, decreased caffeine.  After guided head/neck relaxation exercises and warm up exercises, pt is able to intermittently achieve an adequate volume and vocal quality to be understood easier by a listener.  Pt reports adequate voice in the morning which gets worse throughout the day indicating vocal fatigue. Pt reports improvement in vocal quality at therapy, however, unable to achieve Independently at home.  Poor carryover at home per report.  However, overall improvement in vocal quality during therapy sessions.  Pt reports that she is glad that her voice has improved a little but overall upset that her voice is not back to normal and still extremely difficult to communicate most of the time.     Positional changes: minimal improvement turning head to either side, minimal to no improvement with a chin tuck.  However, noted that when pt bent her whole body over to look in her purse, her volume and quality increased significantly.  Strongly recommend a follow up with ENT as pt  "continues with significant dysphonia.    Pt stated that Dr. Jamil said "that's all he could do for me" and "I shouldn't get another injection."  ST and pt discussed the following statement from Dr. Jamil's note.     Dr. Jamil's note from 7/13/18 under plan stated: "I do not recommend repeating the injection due to the possibility of resulting in overclosure and worsening of symptoms. Further treatment which she may find beneficial is SLP voice evaluation. We will arrange this in the coming weeks. Should her voice deteriorate, we could consider repeating the injection.  She will follow up with me in about 2 months, or sooner if needed."    ST strongly encouraged pt again to follow up with ENT to reassess vocal cord function.       Previous Short Term Goals Status:     1. Patient will complete SOVT exercises (lax vox) 2x daily to strengthen and balance the intrinsic laryngeal musculature and maximize glottic closure without medial hyperfunction... progressing  2. Patient will increase awareness for voice conservations behaviors by following the 50/10 rule and reduce voice use in competing noise environments... progressing  3. Pt will complete neck relaxation exercises 10x each per session/at home ind'ly... progressing      4.Pt will recall and utilize vocal hygiene strategies with min A... MET and ongoing  5. Pt will reduce/eliminate/substitute throat clearing ind'ly.... progressing  6. Pt will participate in PhoRTE exercises to improve vocal cord medialization.... discontinued    Long Term Goal Status:    1. Patient will implement and adhere to vocal hygiene protocols on a daily basis.  2. Patient and clinician will facilitate changes in vocal function in order to restore functional use of voice for daily occupational, social, and emotional demands.    Reasons for Recertification of Therapy:   Pt continues with significant dysphonia c/b low volume, poor breath support, strain, breathiness.  Overall, improvement " noted in all areas since initial evaluation. Pt able to achieve an improved vocal quality during therapy sessions with warm ups, lax vox exercises, and max vocal shaping. Pt would benefit from continued ST to improve vocal quality.     Certification Period:  July 23, 2018 to October 11, 2018  Recommended Treatment Plan: 2 times per week for 2 weeks: Patient Education, Self Care, Therapeutic Activites and Therapeutic Exercise  Other Recommendations: Follow up with ENT to reassess vocal cord function.     Therapist's Name:   Angela Britton M.S.CCC-SLP  Speech Language Pathologist   Date: 9/27/18    I CERTIFY THE NEED FOR THESE SERVICES FURNISHED UNDER THIS PLAN OF TREATMENT AND WHILE UNDER MY CARE    Physician's comments: ________________________________________________________________________________________________________________________________________________      Physician's Name: ___________________________________

## 2018-10-15 ENCOUNTER — TELEPHONE (OUTPATIENT)
Dept: OTOLARYNGOLOGY | Facility: CLINIC | Age: 83
End: 2018-10-15

## 2018-10-15 NOTE — TELEPHONE ENCOUNTER
----- Message from Essie Gillis sent at 10/15/2018 10:50 AM CDT -----  Contact: patient   Please call above patient has questions about her records also I did transfer her to medical records still wants to talk to the nurse

## 2018-10-17 ENCOUNTER — TELEPHONE (OUTPATIENT)
Dept: OTOLARYNGOLOGY | Facility: CLINIC | Age: 83
End: 2018-10-17

## 2018-10-17 NOTE — TELEPHONE ENCOUNTER
----- Message from Sal Ruiz sent at 10/17/2018  5:28 PM CDT -----  Contact: Pt called   Patient needs medical records for her surgery she has with Dr. Buck. She has been waiting on a call all day.       Callback: [phone]

## 2018-10-20 ENCOUNTER — OFFICE VISIT (OUTPATIENT)
Dept: URGENT CARE | Facility: CLINIC | Age: 83
End: 2018-10-20
Payer: MEDICARE

## 2018-10-20 VITALS
BODY MASS INDEX: 19.24 KG/M2 | TEMPERATURE: 98 F | RESPIRATION RATE: 12 BRPM | HEART RATE: 70 BPM | SYSTOLIC BLOOD PRESSURE: 142 MMHG | DIASTOLIC BLOOD PRESSURE: 60 MMHG | HEIGHT: 60 IN | WEIGHT: 98 LBS | OXYGEN SATURATION: 94 %

## 2018-10-20 DIAGNOSIS — R05.9 COUGH: ICD-10-CM

## 2018-10-20 DIAGNOSIS — L08.9 SKIN INFECTION: Primary | ICD-10-CM

## 2018-10-20 PROCEDURE — 1101F PT FALLS ASSESS-DOCD LE1/YR: CPT | Mod: CPTII,S$GLB,, | Performed by: PHYSICIAN ASSISTANT

## 2018-10-20 PROCEDURE — 99214 OFFICE O/P EST MOD 30 MIN: CPT | Mod: S$GLB,,, | Performed by: PHYSICIAN ASSISTANT

## 2018-10-20 PROCEDURE — 3077F SYST BP >= 140 MM HG: CPT | Mod: CPTII,S$GLB,, | Performed by: PHYSICIAN ASSISTANT

## 2018-10-20 PROCEDURE — 3078F DIAST BP <80 MM HG: CPT | Mod: CPTII,S$GLB,, | Performed by: PHYSICIAN ASSISTANT

## 2018-10-20 RX ORDER — DOXYCYCLINE 100 MG/1
100 CAPSULE ORAL 2 TIMES DAILY
Qty: 10 CAPSULE | Refills: 0 | Status: SHIPPED | OUTPATIENT
Start: 2018-10-20 | End: 2018-10-25

## 2018-10-20 NOTE — PROGRESS NOTES
Subjective:       Patient ID: Toya Majano is a 85 y.o. female.    Vitals:  height is 5' (1.524 m) and weight is 44.5 kg (98 lb). Her tympanic temperature is 97.8 °F (36.6 °C). Her blood pressure is 142/60 (abnormal) and her pulse is 70. Her respiration is 12 and oxygen saturation is 94 (abnormal)%.     Chief Complaint: Cough    Cough for 3 days. Pt also complains of a sore on her left hand. She states the abscess was drained and she was put on minocycline 4 days ago, but she states she is having diarrhea as a side effect, so she stopped the medication and started taking an old doxycycline prescription. She ran out and is requesting doxycycline (not minocycline) because she states doxy does not affect her.      Cough   This is a new problem. The current episode started in the past 7 days. The problem has been unchanged. The cough is non-productive. Associated symptoms include nasal congestion, postnasal drip and a sore throat. Pertinent negatives include no chest pain, chills, ear pain, eye redness, fever, headaches, myalgias, shortness of breath or wheezing. Nothing aggravates the symptoms. She has tried nothing for the symptoms. Her past medical history is significant for asthma. There is no history of bronchitis, COPD or pneumonia.     Review of Systems   Constitution: Negative for chills, fever and malaise/fatigue.   HENT: Positive for congestion, postnasal drip and sore throat. Negative for ear pain and hoarse voice.    Eyes: Negative for discharge and redness.   Cardiovascular: Negative for chest pain, dyspnea on exertion and leg swelling.   Respiratory: Positive for cough. Negative for shortness of breath, sputum production and wheezing.    Musculoskeletal: Negative for myalgias.   Gastrointestinal: Positive for diarrhea. Negative for abdominal pain and nausea.   Neurological: Negative for headaches.       Objective:      Physical Exam   Constitutional: She is oriented to person, place, and time. She  appears well-developed and well-nourished. She does not appear ill. No distress.   HENT:   Head: Normocephalic and atraumatic.   Right Ear: Hearing, tympanic membrane, external ear and ear canal normal.   Left Ear: Hearing, tympanic membrane, external ear and ear canal normal.   Nose: Nose normal. No mucosal edema. Right sinus exhibits no maxillary sinus tenderness and no frontal sinus tenderness. Left sinus exhibits no maxillary sinus tenderness and no frontal sinus tenderness.   Mouth/Throat: Uvula is midline and oropharynx is clear and moist. No oropharyngeal exudate, posterior oropharyngeal edema or posterior oropharyngeal erythema.   Eyes: Conjunctivae, EOM and lids are normal. Right eye exhibits no discharge. Left eye exhibits no discharge.   Neck: Normal range of motion. Neck supple.   Cardiovascular: Normal rate, regular rhythm and normal heart sounds. Exam reveals no gallop and no friction rub.   No murmur heard.  Pulmonary/Chest: Effort normal and breath sounds normal. No stridor. No respiratory distress. She has no decreased breath sounds. She has no wheezes. She has no rhonchi. She has no rales.   Musculoskeletal: Normal range of motion.        Hands:  Lymphadenopathy:        Head (right side): No submandibular and no tonsillar adenopathy present.        Head (left side): No submandibular and no tonsillar adenopathy present.   Neurological: She is alert and oriented to person, place, and time.   Skin: Skin is warm and dry. No rash noted. She is not diaphoretic. There is erythema.   Psychiatric: She has a normal mood and affect. Her behavior is normal.   Nursing note and vitals reviewed.      Assessment:       1. Skin infection    2. Cough        Plan:       Abscess healing well. Sending in doxy as requested to complete antibiotic prescription. Pt states her normal O2 sat is usually between 89 and 93, so 94% is good for her.    Skin infection  -     doxycycline (VIBRAMYCIN) 100 MG Cap; Take 1 capsule (100  mg total) by mouth 2 (two) times daily. for 5 days  Dispense: 10 capsule; Refill: 0    Cough      Patient Instructions   -Please take antibiotic to completion.  Below are suggestions for symptomatic relief:   -Tylenol every 4 hours OR ibuprofen every 6 hours as needed for pain/fever.   -Salt water gargles to soothe throat pain.   -Chloroseptic spray also helps to numb throat pain.   -Nasal saline spray reduces inflammation and dryness.   -Warm face compresses to help with facial sinus pain/pressure.   -Vicks vapor rub at night.   -Flonase OTC or Nasacort OTC for nasal congestion.   -Simple foods like chicken noodle soup.   -Delsym helps with coughing at night   -Zyrtec/Claritin during the day & Benadryl at night may help with allergies.     If you DO NOT have Hypertension or any history of palpitations, it is ok to take over the counter Sudafed or Mucinex D or Allegra-D or Claritin-D or Zyrtec-D.  If you do take one of the above, it is ok to combine that with plain over the counter Mucinex or Allegra or Claritin or Zyrtec. If, for example, you are taking Zyrtec -D, you can combine that with Mucinex, but not Mucinex-D.  If you are taking Mucinex-D, you can combine that with plain Allegra or Claritin or Zyrtec.   If you DO have Hypertension or palpitations, it is safe to take Coricidin HBP for relief of sinus symptoms.    Please follow up with your primary care provider within 2-5 days if your signs and symptoms have not resolved or worsen.     If your condition worsens or fails to improve we recommend that you receive another evaluation at the emergency room immediately or contact your primary medical clinic to discuss your concerns.   You must understand that you have received an Urgent Care treatment only and that you may be released before all of your medical problems are known or treated. You, the patient, will arrange for follow up care as instructed.